# Patient Record
Sex: FEMALE | Race: WHITE | NOT HISPANIC OR LATINO | Employment: FULL TIME | URBAN - METROPOLITAN AREA
[De-identification: names, ages, dates, MRNs, and addresses within clinical notes are randomized per-mention and may not be internally consistent; named-entity substitution may affect disease eponyms.]

---
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
## 2021-09-01 ENCOUNTER — TRANSCRIBE ORDERS (OUTPATIENT)
Dept: LAB | Facility: HOSPITAL | Age: 21
End: 2021-09-01

## 2021-09-01 ENCOUNTER — LAB (OUTPATIENT)
Dept: LAB | Facility: HOSPITAL | Age: 21
End: 2021-09-01

## 2021-09-01 DIAGNOSIS — M79.81 NONTRAUMATIC HEMATOMA OF SOFT TISSUE: ICD-10-CM

## 2021-09-01 DIAGNOSIS — M79.81 NONTRAUMATIC HEMATOMA OF SOFT TISSUE: Primary | ICD-10-CM

## 2021-09-01 LAB
BASOPHILS # BLD MANUAL: 0.06 10*3/MM3 (ref 0–0.2)
BASOPHILS NFR BLD AUTO: 1 % (ref 0–1.5)
DEPRECATED RDW RBC AUTO: 38.5 FL (ref 37–54)
EOSINOPHIL # BLD MANUAL: 0.23 10*3/MM3 (ref 0–0.4)
EOSINOPHIL NFR BLD MANUAL: 4 % (ref 0.3–6.2)
ERYTHROCYTE [DISTWIDTH] IN BLOOD BY AUTOMATED COUNT: 13.2 % (ref 12.3–15.4)
HCT VFR BLD AUTO: 37.7 % (ref 34–46.6)
HGB BLD-MCNC: 11.7 G/DL (ref 12–15.9)
LYMPHOCYTES # BLD MANUAL: 1.31 10*3/MM3 (ref 0.7–3.1)
LYMPHOCYTES NFR BLD MANUAL: 23 % (ref 19.6–45.3)
LYMPHOCYTES NFR BLD MANUAL: 4 % (ref 5–12)
MCH RBC QN AUTO: 25.1 PG (ref 26.6–33)
MCHC RBC AUTO-ENTMCNC: 31 G/DL (ref 31.5–35.7)
MCV RBC AUTO: 80.9 FL (ref 79–97)
MONOCYTES # BLD AUTO: 0.23 10*3/MM3 (ref 0.1–0.9)
NEUTROPHILS # BLD AUTO: 3.86 10*3/MM3 (ref 1.7–7)
NEUTROPHILS NFR BLD MANUAL: 68 % (ref 42.7–76)
PLAT MORPH BLD: NORMAL
PLATELET # BLD AUTO: 215 10*3/MM3 (ref 140–450)
PMV BLD AUTO: 11.9 FL (ref 6–12)
RBC # BLD AUTO: 4.66 10*6/MM3 (ref 3.77–5.28)
RBC MORPH BLD: NORMAL
WBC # BLD AUTO: 5.68 10*3/MM3 (ref 3.4–10.8)
WBC MORPH BLD: NORMAL

## 2021-09-01 PROCEDURE — 85007 BL SMEAR W/DIFF WBC COUNT: CPT

## 2021-09-01 PROCEDURE — 36415 COLL VENOUS BLD VENIPUNCTURE: CPT

## 2021-09-01 PROCEDURE — 85027 COMPLETE CBC AUTOMATED: CPT

## 2021-09-04 ENCOUNTER — TRANSCRIBE ORDERS (OUTPATIENT)
Dept: LAB | Facility: HOSPITAL | Age: 21
End: 2021-09-04

## 2021-09-04 ENCOUNTER — LAB (OUTPATIENT)
Dept: LAB | Facility: HOSPITAL | Age: 21
End: 2021-09-04

## 2021-09-04 DIAGNOSIS — R77.2 ELEVATED ALPHA FETOPROTEIN: ICD-10-CM

## 2021-09-04 DIAGNOSIS — R77.2 ELEVATED ALPHA FETOPROTEIN: Primary | ICD-10-CM

## 2021-09-04 LAB — FERRITIN SERPL-MCNC: 11 NG/ML (ref 13–150)

## 2021-09-04 PROCEDURE — 82728 ASSAY OF FERRITIN: CPT

## 2021-09-04 PROCEDURE — 36415 COLL VENOUS BLD VENIPUNCTURE: CPT

## 2021-09-28 ENCOUNTER — OFFICE VISIT (OUTPATIENT)
Dept: FAMILY MEDICINE CLINIC | Facility: CLINIC | Age: 21
End: 2021-09-28

## 2021-09-28 VITALS
WEIGHT: 139 LBS | RESPIRATION RATE: 16 BRPM | HEART RATE: 111 BPM | SYSTOLIC BLOOD PRESSURE: 130 MMHG | OXYGEN SATURATION: 98 % | HEIGHT: 66 IN | BODY MASS INDEX: 22.34 KG/M2 | DIASTOLIC BLOOD PRESSURE: 82 MMHG

## 2021-09-28 DIAGNOSIS — Z76.89 ENCOUNTER TO ESTABLISH CARE: Primary | ICD-10-CM

## 2021-09-28 DIAGNOSIS — N94.6 MENSTRUAL CRAMPS: ICD-10-CM

## 2021-09-28 DIAGNOSIS — D50.0 IRON DEFICIENCY ANEMIA DUE TO CHRONIC BLOOD LOSS: ICD-10-CM

## 2021-09-28 DIAGNOSIS — J45.20 MILD INTERMITTENT ASTHMA WITHOUT COMPLICATION: ICD-10-CM

## 2021-09-28 DIAGNOSIS — J01.00 ACUTE NON-RECURRENT MAXILLARY SINUSITIS: ICD-10-CM

## 2021-09-28 DIAGNOSIS — J02.9 SORE THROAT: ICD-10-CM

## 2021-09-28 LAB
EXPIRATION DATE: NORMAL
INTERNAL CONTROL: NORMAL
Lab: NORMAL
S PYO AG THROAT QL: NEGATIVE

## 2021-09-28 PROCEDURE — 99203 OFFICE O/P NEW LOW 30 MIN: CPT | Performed by: PHYSICIAN ASSISTANT

## 2021-09-28 PROCEDURE — 87880 STREP A ASSAY W/OPTIC: CPT | Performed by: PHYSICIAN ASSISTANT

## 2021-09-28 RX ORDER — ALBUTEROL SULFATE 90 UG/1
2 AEROSOL, METERED RESPIRATORY (INHALATION) EVERY 4 HOURS PRN
COMMUNITY

## 2021-09-28 RX ORDER — FERROUS SULFATE 325(65) MG
TABLET ORAL
COMMUNITY
Start: 2021-09-07 | End: 2021-10-12

## 2021-09-28 RX ORDER — AZITHROMYCIN 250 MG/1
TABLET, FILM COATED ORAL
Qty: 6 TABLET | Refills: 0 | Status: SHIPPED | OUTPATIENT
Start: 2021-09-28 | End: 2021-10-11

## 2021-09-28 NOTE — PROGRESS NOTES
Chief Complaint   Patient presents with   • Establish Care   • Nasal Congestion     Started Saturday    • Sore Throat       HPI     Marjorie Goff is a 21 y.o. female who presents to establish care.  Patient has been going to [facility] prior to establishing care.  She is established with gynecology and was seen yesterday.  She has had a Pap smear.  Denies any concerns for STDs.  She is prescribed birth control to help with her menorrhagia and menstrual cramps.  She reports an iron deficiency anemia secondary to her menstrual bleeding.  She is currently taking an iron supplement.  She has been referred to psychiatry as well for depression, anxiety, PTSD and ADD.    Her main concern today is her ongoing nasal drainage, sore throat and facial pressure that started 4 days ago.  She denies shortness of breath, cough, fever/chills, ear pain.  She has not been vaccinated for COVID-19.  He is allergic to penicillin.        Chief Complaint   Patient presents with   • Establish Care   • Nasal Congestion     Started Saturday    • Sore Throat       Past Medical History:   Diagnosis Date   • ADHD (attention deficit hyperactivity disorder)    • Anxiety    • Depression    • PTSD (post-traumatic stress disorder)        History reviewed. No pertinent surgical history.    Family History   Problem Relation Age of Onset   • Depression Mother    • Diabetes Maternal Grandmother    • Stroke Maternal Grandmother    • No Known Problems Father    • Breast cancer Neg Hx    • Ovarian cancer Neg Hx        Social History     Socioeconomic History   • Marital status: Single     Spouse name: Not on file   • Number of children: Not on file   • Years of education: Not on file   • Highest education level: Not on file   Tobacco Use   • Smoking status: Never Smoker   • Smokeless tobacco: Never Used   Substance and Sexual Activity   • Alcohol use: Never   • Drug use: Never   • Sexual activity: Yes     Partners: Female     Birth control/protection:  "Pill       Allergies   Allergen Reactions   • Amoxicillin Hives   • Penicillins Hives       ROS    Review of Systems   Constitutional: Positive for fatigue. Negative for activity change, appetite change, chills, diaphoresis, fever, unexpected weight gain and unexpected weight loss.   HENT: Positive for ear pain, rhinorrhea, sinus pressure and sore throat. Negative for congestion, dental problem, hearing loss, nosebleeds and trouble swallowing.    Eyes: Negative for blurred vision, pain, redness and visual disturbance.   Respiratory: Positive for chest tightness and shortness of breath. Negative for apnea, cough and wheezing.    Cardiovascular: Negative for chest pain, palpitations and leg swelling.   Gastrointestinal: Negative for abdominal distention, abdominal pain, anal bleeding, blood in stool, constipation, diarrhea, nausea, vomiting, GERD and indigestion.   Endocrine: Positive for cold intolerance. Negative for heat intolerance, polydipsia, polyphagia and polyuria.   Genitourinary: Positive for menstrual problem. Negative for decreased urine volume, difficulty urinating, dysuria, frequency, hematuria, urgency and urinary incontinence.   Musculoskeletal: Positive for arthralgias. Negative for gait problem, joint swelling and bursitis.   Skin: Negative for dry skin, rash, skin lesions and bruise.   Neurological: Positive for headache. Negative for dizziness, tremors, seizures, syncope, speech difficulty, weakness, light-headedness, memory problem and confusion.   Hematological: Bruises/bleeds easily.   Psychiatric/Behavioral: Positive for agitation, decreased concentration, sleep disturbance, depressed mood and stress. Negative for behavioral problems, hallucinations and suicidal ideas. The patient is nervous/anxious.        Vitals:    09/28/21 1240   BP: 130/82   Pulse: 111   Resp: 16   SpO2: 98%   Weight: 63 kg (139 lb)   Height: 167.6 cm (66\")   PainSc:   4     Body mass index is 22.44 kg/m².    Current " Outpatient Medications on File Prior to Visit   Medication Sig Dispense Refill   • albuterol sulfate  (90 Base) MCG/ACT inhaler Inhale 2 puffs Every 4 (Four) Hours As Needed.     • FeroSul 325 (65 Fe) MG tablet      • Levonorgest-Eth Estrad-Fe Bisg (BALCOLTRA) 0.1-20 MG-MCG(21) per tablet Take 1 tablet by mouth Daily.       No current facility-administered medications on file prior to visit.       Results for orders placed or performed in visit on 09/28/21   POCT rapid strep A    Specimen: Swab   Result Value Ref Range    Rapid Strep A Screen Negative Negative, VALID, INVALID, Not Performed    Internal Control Passed Passed    Lot Number [id number]     Expiration Date 12,022,022        PE  Physical Exam  Vitals reviewed.   Constitutional:       General: She is not in acute distress.     Appearance: Normal appearance. She is well-developed and normal weight. She is ill-appearing. She is not diaphoretic.   HENT:      Head: Normocephalic and atraumatic.      Right Ear: Hearing, tympanic membrane, ear canal and external ear normal.      Left Ear: Hearing, ear canal and external ear normal. Tympanic membrane is erythematous.      Nose: Rhinorrhea present.      Right Sinus: Maxillary sinus tenderness present. No frontal sinus tenderness.      Left Sinus: Maxillary sinus tenderness present. No frontal sinus tenderness.      Mouth/Throat:      Pharynx: Uvula midline. No oropharyngeal exudate or posterior oropharyngeal erythema.   Eyes:      Extraocular Movements: Extraocular movements intact.      Conjunctiva/sclera: Conjunctivae normal.   Cardiovascular:      Rate and Rhythm: Normal rate and regular rhythm.      Heart sounds: Normal heart sounds. No murmur heard.   No friction rub. No gallop.    Pulmonary:      Effort: Pulmonary effort is normal. No respiratory distress.      Breath sounds: Normal breath sounds.   Musculoskeletal:         General: Normal range of motion.      Cervical back: Normal range of motion.       Right lower leg: No edema.      Left lower leg: No edema.   Skin:     General: Skin is warm.      Findings: No erythema or rash.   Neurological:      General: No focal deficit present.      Mental Status: She is alert and oriented to person, place, and time.   Psychiatric:         Attention and Perception: She is attentive.         Mood and Affect: Mood normal.         Speech: Speech normal.         Behavior: Behavior normal. Behavior is cooperative.         Thought Content: Thought content normal.         Judgment: Judgment normal.         A/P    Diagnoses and all orders for this visit:    1. Encounter to establish care (Primary)    2. Sore throat  -     POCT rapid strep A  Strep negative.    3. Acute non-recurrent maxillary sinusitis  -     azithromycin (Zithromax Z-Rory) 250 MG tablet; Take 2 tablets the first day, then 1 tablet daily for 4 days.  Dispense: 6 tablet; Refill: 0  TTP along bilateral maxillary sinuses with left ear infection.  Cover with azithromycin.    4. Menstrual cramps  Established with gynecology.  Started on birth control.    5. Iron deficiency anemia due to chronic blood loss  On iron tablet daily.  Repeat CBC at follow-up in 3 months.    6. Mild intermittent asthma without complication  Stable currently on inhaler.  Patient is not sure which inhaler she is using, will call with information.       Plan of care reviewed with patient at the conclusion of today's visit. Education was provided regarding diagnosis, management and any prescribed or recommended OTC medications.  Patient verbalizes understanding of and agreement with management plan.    Return in about 4 months (around 1/17/2022) for Annual physical.     Junie Covarrubias PA-C

## 2021-10-11 ENCOUNTER — OFFICE VISIT (OUTPATIENT)
Dept: FAMILY MEDICINE CLINIC | Facility: CLINIC | Age: 21
End: 2021-10-11

## 2021-10-11 ENCOUNTER — LAB (OUTPATIENT)
Dept: LAB | Facility: HOSPITAL | Age: 21
End: 2021-10-11

## 2021-10-11 VITALS
OXYGEN SATURATION: 98 % | BODY MASS INDEX: 23.73 KG/M2 | DIASTOLIC BLOOD PRESSURE: 82 MMHG | HEIGHT: 64 IN | HEART RATE: 94 BPM | WEIGHT: 139 LBS | SYSTOLIC BLOOD PRESSURE: 122 MMHG | TEMPERATURE: 97.7 F

## 2021-10-11 DIAGNOSIS — D50.0 IRON DEFICIENCY ANEMIA DUE TO CHRONIC BLOOD LOSS: ICD-10-CM

## 2021-10-11 DIAGNOSIS — M25.542 ARTHRALGIA OF BOTH HANDS: ICD-10-CM

## 2021-10-11 DIAGNOSIS — G56.23 ULNAR NEUROPATHY OF BOTH UPPER EXTREMITIES: ICD-10-CM

## 2021-10-11 DIAGNOSIS — G56.23 ULNAR NEUROPATHY OF BOTH UPPER EXTREMITIES: Primary | ICD-10-CM

## 2021-10-11 DIAGNOSIS — M25.541 ARTHRALGIA OF BOTH HANDS: ICD-10-CM

## 2021-10-11 LAB
ALBUMIN SERPL-MCNC: 4.8 G/DL (ref 3.5–5.2)
ALBUMIN/GLOB SERPL: 1.9 G/DL
ALP SERPL-CCNC: 54 U/L (ref 39–117)
ALT SERPL W P-5'-P-CCNC: 11 U/L (ref 1–33)
ANION GAP SERPL CALCULATED.3IONS-SCNC: 11.7 MMOL/L (ref 5–15)
AST SERPL-CCNC: 21 U/L (ref 1–32)
BILIRUB SERPL-MCNC: 1 MG/DL (ref 0–1.2)
BUN SERPL-MCNC: 8 MG/DL (ref 6–20)
BUN/CREAT SERPL: 11.1 (ref 7–25)
CALCIUM SPEC-SCNC: 8.8 MG/DL (ref 8.6–10.5)
CHLORIDE SERPL-SCNC: 106 MMOL/L (ref 98–107)
CO2 SERPL-SCNC: 23.3 MMOL/L (ref 22–29)
CREAT SERPL-MCNC: 0.72 MG/DL (ref 0.57–1)
ERYTHROCYTE [SEDIMENTATION RATE] IN BLOOD: 14 MM/HR (ref 0–20)
GFR SERPL CREATININE-BSD FRML MDRD: 103 ML/MIN/1.73
GFR SERPL CREATININE-BSD FRML MDRD: 125 ML/MIN/1.73
GLOBULIN UR ELPH-MCNC: 2.5 GM/DL
GLUCOSE SERPL-MCNC: 66 MG/DL (ref 65–99)
HCT VFR BLD AUTO: 37.7 % (ref 34–46.6)
HGB BLD-MCNC: 11.8 G/DL (ref 12–15.9)
IRON 24H UR-MRATE: 171 MCG/DL (ref 37–145)
IRON SATN MFR SERPL: 36 % (ref 20–50)
POTASSIUM SERPL-SCNC: 5 MMOL/L (ref 3.5–5.2)
PROT SERPL-MCNC: 7.3 G/DL (ref 6–8.5)
SODIUM SERPL-SCNC: 141 MMOL/L (ref 136–145)
TIBC SERPL-MCNC: 480 MCG/DL (ref 298–536)
TRANSFERRIN SERPL-MCNC: 322 MG/DL (ref 200–360)
TSH SERPL DL<=0.05 MIU/L-ACNC: 0.81 UIU/ML (ref 0.27–4.2)

## 2021-10-11 PROCEDURE — 86140 C-REACTIVE PROTEIN: CPT

## 2021-10-11 PROCEDURE — 85652 RBC SED RATE AUTOMATED: CPT

## 2021-10-11 PROCEDURE — 99213 OFFICE O/P EST LOW 20 MIN: CPT | Performed by: PHYSICIAN ASSISTANT

## 2021-10-11 PROCEDURE — 84443 ASSAY THYROID STIM HORMONE: CPT

## 2021-10-11 PROCEDURE — 85014 HEMATOCRIT: CPT

## 2021-10-11 PROCEDURE — 83540 ASSAY OF IRON: CPT

## 2021-10-11 PROCEDURE — 36415 COLL VENOUS BLD VENIPUNCTURE: CPT

## 2021-10-11 PROCEDURE — 85018 HEMOGLOBIN: CPT

## 2021-10-11 PROCEDURE — 84466 ASSAY OF TRANSFERRIN: CPT

## 2021-10-11 PROCEDURE — 80053 COMPREHEN METABOLIC PANEL: CPT

## 2021-10-12 ENCOUNTER — TELEPHONE (OUTPATIENT)
Dept: FAMILY MEDICINE CLINIC | Facility: CLINIC | Age: 21
End: 2021-10-12

## 2021-10-12 LAB — CRP SERPL-MCNC: <0.3 MG/DL (ref 0–0.5)

## 2021-10-12 NOTE — PROGRESS NOTES
"Chief Complaint   Patient presents with   • Hand Pain       HPI      Marjorie Goff is a 21 y.o. female who presents for Hand Pain.  Patient reports new onset and worsening hand pain, right worse than left.  Patient reports pain that extends from her elbow to her pinky.  Pain comes and goes depending upon what she is doing.  She has paresthesia in her finger as well.  No weakness.      Patient is taking iron supplement daily.  Patient reports menorrhagia.  She is established with gynecology and on birth control currently.    Past Medical History:   Diagnosis Date   • ADHD (attention deficit hyperactivity disorder)    • Anxiety    • Depression    • PTSD (post-traumatic stress disorder)        History reviewed. No pertinent surgical history.    Family History   Problem Relation Age of Onset   • Depression Mother    • Diabetes Maternal Grandmother    • Stroke Maternal Grandmother    • No Known Problems Father    • Breast cancer Neg Hx    • Ovarian cancer Neg Hx        Social History     Socioeconomic History   • Marital status: Single   Tobacco Use   • Smoking status: Never Smoker   • Smokeless tobacco: Never Used   Vaping Use   • Vaping Use: Never used   Substance and Sexual Activity   • Alcohol use: Never   • Drug use: Never   • Sexual activity: Yes     Partners: Female     Birth control/protection: Pill       Allergies   Allergen Reactions   • Amoxicillin Hives   • Penicillins Hives       ROS    Review of Systems   Constitutional: Positive for fatigue. Negative for chills and fever.   Respiratory: Negative for shortness of breath.    Musculoskeletal: Positive for arthralgias and myalgias. Negative for joint swelling.   Neurological: Positive for numbness. Negative for weakness.       Vitals:    10/11/21 1131   BP: 122/82   Pulse: 94   Temp: 97.7 °F (36.5 °C)   SpO2: 98%   Weight: 63 kg (139 lb)   Height: 162.6 cm (64\")     Body mass index is 23.86 kg/m².    Current Outpatient Medications on File Prior to Visit "   Medication Sig Dispense Refill   • albuterol sulfate  (90 Base) MCG/ACT inhaler Inhale 2 puffs Every 4 (Four) Hours As Needed.     • FeroSul 325 (65 Fe) MG tablet      • Levonorgest-Eth Estrad-Fe Bisg (BALCOLTRA) 0.1-20 MG-MCG(21) per tablet Take 1 tablet by mouth Daily.     • [DISCONTINUED] azithromycin (Zithromax Z-Rory) 250 MG tablet Take 2 tablets the first day, then 1 tablet daily for 4 days. 6 tablet 0     No current facility-administered medications on file prior to visit.       Results for orders placed or performed in visit on 09/28/21   POCT rapid strep A    Specimen: Swab   Result Value Ref Range    Rapid Strep A Screen Negative Negative, VALID, INVALID, Not Performed    Internal Control Passed Passed    Lot Number [id number]     Expiration Date 12,022,022        PE    Physical Exam  Vitals reviewed.   Constitutional:       General: She is not in acute distress.     Appearance: Normal appearance. She is well-developed and normal weight. She is not ill-appearing or diaphoretic.   HENT:      Head: Normocephalic and atraumatic.   Eyes:      Extraocular Movements: Extraocular movements intact.      Conjunctiva/sclera: Conjunctivae normal.   Pulmonary:      Effort: No respiratory distress.   Musculoskeletal:         General: Normal range of motion.        Arms:       Cervical back: Normal range of motion.   Neurological:      General: No focal deficit present.      Mental Status: She is alert.   Psychiatric:         Attention and Perception: She is attentive.         Mood and Affect: Mood normal.         Speech: Speech normal.         Behavior: Behavior normal. Behavior is cooperative.         Thought Content: Thought content normal.         Judgment: Judgment normal.          A/P    Diagnoses and all orders for this visit:    1. Ulnar neuropathy of both upper extremities (Primary)  -     EMG & Nerve Conduction Test; Future  -     Comprehensive Metabolic Panel; Future  Pain is reproduced with ulnar  manipulation.  Check EMG/NCS.    2. Arthralgia of both hands  -     C-reactive Protein; Future  -     Sedimentation Rate; Future  -     TSH Rfx On Abnormal To Free T4; Future  -     Comprehensive Metabolic Panel; Future  Check labs.  Suspect ulnar neuropathy.  R/O inflammation.  If labs are elevated, will check RF and ERIN.    3. Iron deficiency anemia due to chronic blood loss  -     Iron Profile; Future  -     Hemoglobin and hematocrit, blood; Future  -     Comprehensive Metabolic Panel; Future  Currently on iron supplementation.       Plan of care reviewed with patient at the conclusion of today's visit. Education was provided regarding diagnosis, management and any prescribed or recommended OTC medications.  Patient verbalizes understanding of and agreement with management plan.    No follow-ups on file.     Junie Covarrubias PA-C

## 2021-11-23 ENCOUNTER — HOSPITAL ENCOUNTER (OUTPATIENT)
Dept: NEUROLOGY | Facility: HOSPITAL | Age: 21
Discharge: HOME | End: 2021-11-23
Admitting: PHYSICIAN ASSISTANT

## 2021-11-23 DIAGNOSIS — G56.23 ULNAR NEUROPATHY OF BOTH UPPER EXTREMITIES: ICD-10-CM

## 2021-11-23 DIAGNOSIS — G56.23 ULNAR NEUROPATHY OF BOTH UPPER EXTREMITIES: Primary | ICD-10-CM

## 2021-11-23 PROCEDURE — 95886 MUSC TEST DONE W/N TEST COMP: CPT

## 2021-11-23 PROCEDURE — 95908 NRV CNDJ TST 3-4 STUDIES: CPT

## 2021-11-24 ENCOUNTER — TELEPHONE (OUTPATIENT)
Dept: FAMILY MEDICINE CLINIC | Facility: CLINIC | Age: 21
End: 2021-11-24

## 2021-11-24 NOTE — TELEPHONE ENCOUNTER
Caller: Marjorie Goff    Relationship: Self    Best call back number: [phone]    Who are you requesting to speak with (clinical staff, provider,  specific staff member): MOE CHAVIRA    What was the call regarding: PATIENT STATED SURGERY WAS RECOMMENDED AFTER HER EMG TEST AND SHE WOULD LIKE TO KNOW IF THERE ARE ANY OTHER OPTIONS TO HELP HER.    Do you require a callback: YES

## 2021-11-27 NOTE — TELEPHONE ENCOUNTER
I am not sure if surgery is the recommendation.  She will need to see orthopedic surgery to determine what options they feel will benefit her.  It looks like she has appointment on 12/02 with Dr. Muñoz and she can discuss further with him.

## 2021-12-02 ENCOUNTER — OFFICE VISIT (OUTPATIENT)
Dept: ORTHOPEDIC SURGERY | Facility: CLINIC | Age: 21
End: 2021-12-02

## 2021-12-02 VITALS
HEIGHT: 64 IN | SYSTOLIC BLOOD PRESSURE: 139 MMHG | DIASTOLIC BLOOD PRESSURE: 93 MMHG | BODY MASS INDEX: 23.71 KG/M2 | HEART RATE: 121 BPM | WEIGHT: 138.89 LBS

## 2021-12-02 DIAGNOSIS — G56.21 ULNAR NEUROPATHY AT ELBOW, RIGHT: Primary | ICD-10-CM

## 2021-12-02 DIAGNOSIS — M25.521 RIGHT ELBOW PAIN: ICD-10-CM

## 2021-12-02 PROCEDURE — 99204 OFFICE O/P NEW MOD 45 MIN: CPT | Performed by: ORTHOPAEDIC SURGERY

## 2021-12-02 RX ORDER — METHYLPREDNISOLONE 4 MG/1
TABLET ORAL
Qty: 1 EACH | Refills: 0 | OUTPATIENT
Start: 2021-12-02 | End: 2021-12-29

## 2021-12-02 RX ORDER — MELOXICAM 7.5 MG/1
TABLET ORAL
Qty: 30 TABLET | Refills: 1 | OUTPATIENT
Start: 2021-12-02 | End: 2021-12-29

## 2021-12-02 NOTE — PROGRESS NOTES
[facility] Orthopaedic Surgery Office Visit - Brian Muñoz MD    Office Visit       Patient Name: Marjorie Goff    Chief Complaint:   Chief Complaint   Patient presents with   • Right Elbow - Pain       Referring Physician: Junie Covarrubias,*-I appreciate the referral    History of Present Illness:   Marjorie Goff is a 21 y.o. female who presents with right body part: elbow Reason: pain.  Onset:Onset: atraumatic and gradual in nature. The issue has been ongoing for 3 month(s). Pain is a 4/10 on the pain scale. Pain is described as Pain Characterization: aching and shooting. Associated symptoms include Symptoms: pain and giving way/buckling. The pain is worse with working and any movement of the joint; resting and pain medication and/or NSAID improve the pain. Previous treatments have included: NSAIDS. I have reviewed the patient's history of present illness as noted/entered above.    I have reviewed the patient's past medical history, surgical history, social history, family history, medications, and allergies as noted in the electronic medical record and as noted/entered.  I have reviewed the patient's review of systems as noted/enter and updated as noted in the patient's HPI.      RIGHT elbow ulnar neuropathy  Sleeps in fetal position -- counseled on prevention of ulnar neuropathy, possible bracing if desires he can be obtained over-the-counter      Subjective   Subjective      Review of Systems   Constitutional: Negative.  Negative for chills, fatigue and fever.   HENT: Negative.  Negative for congestion and dental problem.    Eyes: Negative.  Negative for blurred vision.   Respiratory: Negative.  Negative for shortness of breath.    Cardiovascular: Negative.  Negative for leg swelling.   Gastrointestinal: Negative.  Negative for abdominal pain.   Endocrine: Negative.  Negative for polyuria.   Genitourinary: Negative.  Negative for  difficulty urinating.   Musculoskeletal: Positive for arthralgias.   Skin: Negative.    Allergic/Immunologic: Negative.    Neurological: Negative.    Hematological: Negative.  Negative for adenopathy.   Psychiatric/Behavioral: Negative.  Negative for behavioral problems.        Past Medical History:   Past Medical History:   Diagnosis Date   • ADHD (attention deficit hyperactivity disorder)    • Anxiety    • Depression    • PTSD (post-traumatic stress disorder)        Past Surgical History: No past surgical history on file.    Family History:   Family History   Problem Relation Age of Onset   • Depression Mother    • Diabetes Maternal Grandmother    • Stroke Maternal Grandmother    • No Known Problems Father    • Breast cancer Neg Hx    • Ovarian cancer Neg Hx        Social History:   Social History     Socioeconomic History   • Marital status: Single   Tobacco Use   • Smoking status: Never Smoker   • Smokeless tobacco: Never Used   Vaping Use   • Vaping Use: Never used   Substance and Sexual Activity   • Alcohol use: Never   • Drug use: Never   • Sexual activity: Yes     Partners: Female     Birth control/protection: Pill       Medications:   Current Outpatient Medications:   •  albuterol sulfate  (90 Base) MCG/ACT inhaler, Inhale 2 puffs Every 4 (Four) Hours As Needed., Disp: , Rfl:   •  Levonorgest-Eth Estrad-Fe Bisg (BALCOLTRA) 0.1-20 MG-MCG(21) per tablet, Take 1 tablet by mouth Daily., Disp: , Rfl:   •  meloxicam (MOBIC) 7.5 MG tablet, 1 Oral Daily with food., Disp: 30 tablet, Rfl: 1  •  methylPREDNISolone (MEDROL) 4 MG dose pack, Use as directed by package instructions, Disp: 1 each, Rfl: 0    Allergies:   Allergies   Allergen Reactions   • Amoxicillin Hives   • Penicillins Hives       The following portions of the patient's history were reviewed and updated as appropriate: allergies, current medications, past family history, past medical history, past social history, past surgical history and problem  "list.        Objective    Objective      Vital Signs:   Vitals:    12/02/21 1431   BP: 139/93   Pulse: (!) 121   Weight: 63 kg (138 lb 14.2 oz)   Height: 162.6 cm (64.02\")       Ortho Exam:  Right elbow demonstrates no skin changes, mild Tinel's over the ulnar nerve excellent 5 out of 5 strength and sensation light touch is intact distally including the ulnar nerve distribution today.  Her ulnar nerve symptoms are transient and doing better today.  Slight elevated pulse noted with vitals    Results Review:   Imaging Results (Last 24 Hours)     Procedure Component Value Units Date/Time    XR Elbow 2 View Right [[id number]] Resulted: 12/02/21 1454     Updated: 12/02/21 1454    Narrative:      Imaging: elbow x-rays 2 views - AP and lateral elbow x-ray views    Side: RIGHT ELBOW    Indication for elbow x-ray 2 views: elbow pain    Comparison: no comparison views available    Findings: no acute bony finding noted, no obvious soft tissue edema    I personally reviewed the above x-rays and discussed with the patient.          EMG & Nerve Conduction Test    Result Date: 11/23/2021  Ulnar neuropathy at the elbow on the right, mild-moderate This report is transcribed using the Dragon dictation system.       I reviewed the findings above does have some early findings of ulnar neuropathy the right elbow discussed with patient    Procedures             Assessment / Plan      Assessment/Plan:   Problem List Items Addressed This Visit        Musculoskeletal and Injuries    Right elbow pain    Relevant Medications    methylPREDNISolone (MEDROL) 4 MG dose pack    meloxicam (MOBIC) 7.5 MG tablet    Other Relevant Orders    XR Elbow 2 View Right (Completed)       Neuro    Ulnar neuropathy at elbow, right - Primary    Relevant Medications    methylPREDNISolone (MEDROL) 4 MG dose pack    meloxicam (MOBIC) 7.5 MG tablet        Counseled on right ulnar neuropathy could have a neuritis type picture given the transient nature I think she " will respond to Medrol Dosepak and anti-inflammatories.  I think activity modifications of sleep to prevent the fetal type position with sleeping will help as well.  Counseled demonstrated and discussed over-the-counter brace for this as well if desired.  Should keep me updated over the next 6 weeks her symptoms at this time are transient and I would recommend against surgery.  Counseled on conservative versus operative treatment options.      Medrol dosepak - risks and benefit of this medication was discussed including risks with Diabetes and bump in blood glucose.  A 6-day steroid Medrol dosepak was recommended followed by an NSAID after the dosepak is finished.        NSAIDS - risks and benefits of these medications were discussed.  These medications are certainly not without risks, but they can provide relief of pain caused due to inflammation.    Follow Up: 6 weeks to reassess        Brian Muñoz MD, FAAOS  Orthopedic Surgeon  Fellowship Trained Shoulder and Elbow Surgeon  [facility]  Orthopedics and Sports Medicine  [address] [place] [address], [address]  [place] [address]    12/02/21  15:02 EST

## 2021-12-03 ENCOUNTER — OFFICE VISIT (OUTPATIENT)
Dept: FAMILY MEDICINE CLINIC | Facility: CLINIC | Age: 21
End: 2021-12-03

## 2021-12-03 VITALS
WEIGHT: 140.4 LBS | TEMPERATURE: 97.1 F | HEART RATE: 138 BPM | DIASTOLIC BLOOD PRESSURE: 86 MMHG | OXYGEN SATURATION: 97 % | HEIGHT: 64 IN | BODY MASS INDEX: 23.97 KG/M2 | SYSTOLIC BLOOD PRESSURE: 128 MMHG

## 2021-12-03 DIAGNOSIS — J06.9 VIRAL UPPER RESPIRATORY TRACT INFECTION: Primary | ICD-10-CM

## 2021-12-03 DIAGNOSIS — R00.0 TACHYCARDIA: ICD-10-CM

## 2021-12-03 PROCEDURE — 87804 INFLUENZA ASSAY W/OPTIC: CPT | Performed by: PHYSICIAN ASSISTANT

## 2021-12-03 PROCEDURE — 93000 ELECTROCARDIOGRAM COMPLETE: CPT | Performed by: PHYSICIAN ASSISTANT

## 2021-12-03 PROCEDURE — U0004 COV-19 TEST NON-CDC HGH THRU: HCPCS | Performed by: PHYSICIAN ASSISTANT

## 2021-12-03 PROCEDURE — 99213 OFFICE O/P EST LOW 20 MIN: CPT | Performed by: PHYSICIAN ASSISTANT

## 2021-12-03 NOTE — PROGRESS NOTES
"    Chief Complaint   Patient presents with   • Fever     Pt states symptoms have only been active for a day and half   • Generalized Body Aches   • Chills   • Nasal Congestion     Runny nose   • Anxiety     Completed EVON questions and pt would like to discuss with dr JONNY Amador [place] is a pleasant 21 y.o. female with a PMH of asthma who presents for evaluation of \"chief complaint.\"    Patient c/o runny nose, chills, body aches, sinus pressure, and fever since yesterday morning. Her temperature at home was 100.1 earlier today. 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 She has not been vaccinated for COVID or influenza. Denies shortness of breath, wheezing, diarrhea, vomiting, loss of taste/smell, or headache.       Past Medical History:   Diagnosis Date   • ADHD (attention deficit hyperactivity disorder)    • Anxiety    • Depression    • PTSD (post-traumatic stress disorder)        History reviewed. No pertinent surgical history.    Family History   Problem Relation Age of Onset   • Depression Mother    • Diabetes Maternal Grandmother    • Stroke Maternal Grandmother    • No Known Problems Father    • Breast cancer Neg Hx    • Ovarian cancer Neg Hx        Social History     Socioeconomic History   • Marital status: Single   Tobacco Use   • Smoking status: Never Smoker   • Smokeless tobacco: Never Used   Vaping Use   • Vaping Use: Never used   Substance and Sexual Activity   • Alcohol use: Never   • Drug use: Never   • Sexual activity: Yes     Partners: Female     Birth control/protection: Pill       Allergies   Allergen Reactions   • Amoxicillin Hives   • Penicillins Hives       ROS    Review of Systems   Constitutional: Positive for chills and fever.   HENT: Positive for congestion, rhinorrhea and sinus pressure. Negative for ear pain, postnasal drip and sore throat.    Respiratory: Negative for cough, " shortness of breath and wheezing.    Cardiovascular: Negative for chest pain.   Gastrointestinal: Negative for diarrhea, nausea and vomiting.   Genitourinary: Negative for dysuria and frequency.   Musculoskeletal: Positive for myalgias.   Neurological: Negative for headache.       Vitals:    12/03/21 1540   BP: 128/86   Pulse: (!) 138   Temp: 97.1 °F (36.2 °C)   SpO2: 97%     Body mass index is 24.09 kg/m².      Current Outpatient Medications:   •  albuterol sulfate  (90 Base) MCG/ACT inhaler, Inhale 2 puffs Every 4 (Four) Hours As Needed., Disp: , Rfl:   •  Levonorgest-Eth Estrad-Fe Bisg (BALCOLTRA) 0.1-20 MG-MCG(21) per tablet, Take 1 tablet by mouth Daily., Disp: , Rfl:   •  meloxicam (MOBIC) 7.5 MG tablet, 1 Oral Daily with food., Disp: 30 tablet, Rfl: 1  •  methylPREDNISolone (MEDROL) 4 MG dose pack, Use as directed by package instructions, Disp: 1 each, Rfl: 0    PE    Physical Exam  Vitals reviewed.   Constitutional:       General: She is not in acute distress.     Appearance: She is well-developed.   HENT:      Head: Normocephalic.      Right Ear: Tympanic membrane and ear canal normal. Tympanic membrane is not erythematous.      Left Ear: Tympanic membrane and ear canal normal. Tympanic membrane is not erythematous.      Nose:      Right Sinus: No maxillary sinus tenderness or frontal sinus tenderness.      Left Sinus: No maxillary sinus tenderness or frontal sinus tenderness.      Mouth/Throat:      Mouth: Mucous membranes are moist.      Pharynx: Oropharynx is clear. Uvula midline. No posterior oropharyngeal erythema.      Tonsils: No tonsillar exudate.   Eyes:      General:         Right eye: No discharge.         Left eye: No discharge.      Conjunctiva/sclera: Conjunctivae normal.   Cardiovascular:      Rate and Rhythm: Regular rhythm. Tachycardia present.      Heart sounds: No murmur heard.      Pulmonary:      Effort: Pulmonary effort is normal. No respiratory distress.      Breath sounds:  Normal breath sounds. No wheezing, rhonchi or rales.   Chest:   Breasts:      Right: No supraclavicular adenopathy.      Left: No supraclavicular adenopathy.       Musculoskeletal:      Cervical back: Normal range of motion and neck supple.   Lymphadenopathy:      Cervical: No cervical adenopathy.      Upper Body:      Right upper body: No supraclavicular adenopathy.      Left upper body: No supraclavicular adenopathy.   Skin:     General: Skin is warm and dry.   Psychiatric:         Behavior: Behavior normal.         ECG 12 Lead    Date/Time: 12/3/2021 4:20 PM  Performed by: Jay Santana PA  Authorized by: Jay Santana PA   Comparison: not compared with previous ECG   Rhythm: sinus tachycardia  Rate: tachycardic  BPM: 118  Conduction: conduction normal  Other findings: T wave abnormality    Clinical impression: non-specific ECG  Comments: Nonspecific T wave abnormality (inverted T wave in lead III)          A/P    Problem List Items Addressed This Visit     None      Visit Diagnoses     Viral upper respiratory tract infection    -  Primary  -Recent exposures to influenza. Influenza swab is negative today  -Will also order COVID and mono testing   -Discussed symptomatic/supportive management  -Patient was counseled to quarantine at home until her COVID test returns. She should be reevaluated at the ER or [facility] if she has shortness of breath    Relevant Orders    POC Influenza A / B    COVID-19 PCR, [facility], NP SWAB IN [facility] TRANSPORT MEDIA/ORAL SWISH 24-30 HR TAT - Swab, Nasopharynx    Tachycardia      -sinus tachycardia    Relevant Orders    ECG 12 Lead          Plan of care was reviewed with patient at the conclusion of today's visit. Education was provided regarding diagnoses, management, prescribed or recommended OTC products, and the importance of compliance with follow-up appointments. The patient was counseled regarding the risks, benefits, and possible side-effects of treatment. I advised the  patient to keep me informed of any acute changes in their status including new, worsening, or persistent symptoms. Patient expresses understanding and agreement with the management plan.        PELON Carson

## 2021-12-04 LAB
EXPIRATION DATE: NORMAL
FLUAV AG NPH QL: NEGATIVE
FLUBV AG NPH QL: NEGATIVE
INTERNAL CONTROL: NORMAL
Lab: 2780
SARS-COV-2 RNA NOSE QL NAA+PROBE: NOT DETECTED

## 2021-12-05 DIAGNOSIS — J06.9 VIRAL UPPER RESPIRATORY TRACT INFECTION: Primary | ICD-10-CM

## 2021-12-06 ENCOUNTER — TELEPHONE (OUTPATIENT)
Dept: FAMILY MEDICINE CLINIC | Facility: CLINIC | Age: 21
End: 2021-12-06

## 2021-12-06 ENCOUNTER — LAB (OUTPATIENT)
Dept: LAB | Facility: HOSPITAL | Age: 21
End: 2021-12-06

## 2021-12-06 DIAGNOSIS — J06.9 VIRAL UPPER RESPIRATORY TRACT INFECTION: ICD-10-CM

## 2021-12-06 LAB — HETEROPH AB SER QL LA: NEGATIVE

## 2021-12-06 PROCEDURE — 86308 HETEROPHILE ANTIBODY SCREEN: CPT

## 2021-12-06 NOTE — TELEPHONE ENCOUNTER
Pt returned call to the office the message was relayed by the hub but pt had additional questions and was transferred to the office.     Pt states she is having a stuffy nose & body sweats. I advised her that PCP has ordered a mono test if she would like to have it done. Lab hours given to patient. Pt verbalized understanding and did not have any additional questions at this time.

## 2021-12-06 NOTE — TELEPHONE ENCOUNTER
Called and spoke with pt. Informed pt of results and information below. Pt verbalized understanding and stated will come into the office to get blood work drawn. Pt has no further questions at this time.     ----- Message from PELON Carson sent at 12/5/2021 10:40 AM EST -----  Please notify the patient her Covid test is negative. If she is still having symptoms, I would like her to come to the lab on Monday for blood work to evaluate for mononucleosis.

## 2021-12-06 NOTE — TELEPHONE ENCOUNTER
"Attempted to contact patient to inform of result notes as ordered by provider and as listed below. No answer; Left voicemail for patient to return call with office number given.HUB please relay the following to patient when she returns call, \"Please notify the patient her Covid test is negative. If she is still having symptoms, I would like her to come to the lab on Monday for blood work to evaluate for mononucleosis.\"    "

## 2021-12-08 ENCOUNTER — TELEPHONE (OUTPATIENT)
Dept: FAMILY MEDICINE CLINIC | Facility: CLINIC | Age: 21
End: 2021-12-08

## 2021-12-08 NOTE — TELEPHONE ENCOUNTER
Patient returned call from provider's office, unable to connect with nurses, please call patient back at [phone].    Thank you.

## 2021-12-08 NOTE — TELEPHONE ENCOUNTER
Contacted patient, relayed results. She states she feels well, still experiencing runny nose and night sweats. I advised her to keep us updated if this does not improve quickly

## 2021-12-08 NOTE — TELEPHONE ENCOUNTER
Attempted to call pt, received no answer. Left  with office number given.     ----- Message from PELON Carson sent at 12/7/2021  5:55 PM EST -----  Please notify the patient that the test for mononucleosis is negative.  Ask for an update on her status.  Thank you.

## 2021-12-29 PROCEDURE — U0004 COV-19 TEST NON-CDC HGH THRU: HCPCS | Performed by: PERSONAL EMERGENCY RESPONSE ATTENDANT

## 2022-01-03 ENCOUNTER — TELEPHONE (OUTPATIENT)
Dept: FAMILY MEDICINE CLINIC | Facility: CLINIC | Age: 22
End: 2022-01-03

## 2022-01-03 NOTE — TELEPHONE ENCOUNTER
Caller: Marjorie Goff    Relationship: Self    Best call back number: [phone]     What is the best time to reach you: ANYTIME     Who are you requesting to speak with (clinical staff, provider,  specific staff member): CLINICAL STAFF     What was the call regarding: PATIENT STATES SHE TESTED POSITIVE FOR COVID ON 12/30/21 AND 1/2/22. PATIENT IS REQUESTING TO KNOW HOW LONG SHE WILL NEED TO BE OFF FROM WORK. PATIENT STATES SHE HAS BEEN TOLD SHE WILL NEED TO QUARANTINE 5 AND 10 DAYS AND WILL NEED TO KNOW WHICH IS CORRECT.    Do you require a callback: YES

## 2022-01-03 NOTE — TELEPHONE ENCOUNTER
Contacted patient, she states that she feels well, only symptom she is experiencing is loss of taste and smell. I advised her that she will need to get release from quarantine date from the [facility].     She verbalized understanding and agreed to follow up with [facility].

## 2022-01-27 ENCOUNTER — OFFICE VISIT (OUTPATIENT)
Dept: ORTHOPEDIC SURGERY | Facility: CLINIC | Age: 22
End: 2022-01-27

## 2022-01-27 VITALS
SYSTOLIC BLOOD PRESSURE: 118 MMHG | HEIGHT: 64 IN | DIASTOLIC BLOOD PRESSURE: 88 MMHG | BODY MASS INDEX: 24.09 KG/M2 | WEIGHT: 141.09 LBS

## 2022-01-27 DIAGNOSIS — M25.521 RIGHT ELBOW PAIN: ICD-10-CM

## 2022-01-27 DIAGNOSIS — G56.21 ULNAR NEUROPATHY AT ELBOW, RIGHT: Primary | ICD-10-CM

## 2022-01-27 PROCEDURE — 99213 OFFICE O/P EST LOW 20 MIN: CPT | Performed by: ORTHOPAEDIC SURGERY

## 2022-01-27 RX ORDER — LEVONORGESTREL AND ETHINYL ESTRADIOL 0.1-0.02MG
KIT ORAL
COMMUNITY
Start: 2021-12-20 | End: 2022-10-24

## 2022-01-27 NOTE — PROGRESS NOTES
[facility] Orthopaedic Surgery Office Follow Up       Office Follow Up Visit       Patient Name: Marjorie Goff    Chief Complaint:   Chief Complaint   Patient presents with   • Follow-up     6 week follow up; Ulnar neuropathy at elbow, right        Referring Physician: No ref. provider found    History of Present Illness:   It has been 6  week(s) since Marjorie Goff's last visit. Marjorie Goff returns to clinic today for F/U: follow-up of rightBody Part: elbowReason: pain. The issue has been ongoing for 3 month(s). Marjorie Goff rates HIS/HER: herpain at 4/10 on the pain scale. Previous/current treatments: bracing, NSAIDS and oral steroids. Current symptoms:Symptoms: same as prior visit. The pain is worse with sleeping, working and lying on affected side; using the brace improves the pain. Overall, he/she: sheis doing better.  I have reviewed the patient's history of present illness as noted/entered above.    I have reviewed the patient's past medical history, surgical history, social history, family history, medications, and allergies as noted in the electronic medical record and as noted/entered.  I have reviewed the patient's review of systems as noted/enter and updated as noted in the patient's HPI.    RIGHT elbow ulnar neuropathy  Sleeps in fetal position -- counseled on prevention of ulnar neuropathy, possible bracing if desires she can be obtained over-the-counter    1/27/2022:  Right elbow cubital tunnel syndrome, some improvements noted treated with Medrol Dosepak, bracing, NSAIDs counseled again on treatment options operative versus nonoperative treatment.    Overall improvements noted    EMG & Nerve Conduction Test     Result Date: 11/23/2021  Ulnar neuropathy at the elbow on the right, mild-moderate This report is transcribed using the Dragon dictation system.         I reviewed the findings above does have some early findings of ulnar  neuropathy the right elbow discussed with patient      Subjective   Subjective      Review of Systems   Constitutional: Positive for fatigue. Negative for chills and fever.   HENT: Positive for ear pain. Negative for congestion and dental problem.    Eyes: Negative.  Negative for blurred vision.   Respiratory: Negative.  Negative for shortness of breath.    Cardiovascular: Negative.  Negative for leg swelling.   Gastrointestinal: Negative.  Negative for abdominal pain.   Endocrine: Negative.  Negative for polyuria.   Genitourinary: Negative.  Negative for difficulty urinating.   Musculoskeletal: Positive for arthralgias.   Skin: Negative.    Allergic/Immunologic: Positive for environmental allergies and food allergies.   Neurological: Negative.    Hematological: Negative.  Negative for adenopathy.   Psychiatric/Behavioral: Negative.  Negative for behavioral problems.        Past Medical History:   Past Medical History:   Diagnosis Date   • ADHD (attention deficit hyperactivity disorder)    • Anxiety    • Depression    • PTSD (post-traumatic stress disorder)        Past Surgical History: History reviewed. No pertinent surgical history.    Family History:   Family History   Problem Relation Age of Onset   • Depression Mother    • Diabetes Maternal Grandmother    • Stroke Maternal Grandmother    • No Known Problems Father    • Breast cancer Neg Hx    • Ovarian cancer Neg Hx        Social History:   Social History     Socioeconomic History   • Marital status: Single   Tobacco Use   • Smoking status: Never Smoker   • Smokeless tobacco: Never Used   Vaping Use   • Vaping Use: Never used   Substance and Sexual Activity   • Alcohol use: Never   • Drug use: Never   • Sexual activity: Yes     Partners: Female     Birth control/protection: Pill       Medications:   Current Outpatient Medications:   •  albuterol sulfate  (90 Base) MCG/ACT inhaler, Inhale 2 puffs Every 4 (Four) Hours As Needed., Disp: , Rfl:   •  Lessina  "0.1-20 MG-MCG per tablet, , Disp: , Rfl:     Allergies:   Allergies   Allergen Reactions   • Amoxicillin Hives   • Penicillins Hives       The following portions of the patient's history were reviewed and updated as appropriate: allergies, current medications, past family history, past medical history, past social history, past surgical history and problem list.        Objective    Objective      Vital Signs:   Vitals:    01/27/22 0940   BP: 118/88   Weight: 64 kg (141 lb 1.5 oz)   Height: 162.6 cm (64.02\")       Ortho Exam:  RIGHT ELBOW  Again the symptoms remain fairly transient she is well-appearing on clinical exam SLT intact and symmetric bilaterally she has excellent strength no motor weakness.  She does have some pain with Tinel's it would be expected but overall appears better    Results Review:  Imaging Results (Last 24 Hours)     ** No results found for the last 24 hours. **          EMG & Nerve Conduction Test    Result Date: 11/23/2021  Ulnar neuropathy at the elbow on the right, mild-moderate This report is transcribed using the Dragon dictation system.         Procedures            Assessment / Plan      Assessment/Plan:   Problem List Items Addressed This Visit        Musculoskeletal and Injuries    Right elbow pain       Neuro    Ulnar neuropathy at elbow, right - Primary        Right cubital tunnel syndrome still remains fairly transient.  Counseled again on her confirmed EMG/NCV findings.  We certainly do not want her to get permanent numbness tingling or motor symptoms.  Counseled she can continue to watch this she desires to avoid surgery I think that is very reasonable.  I will see her back in 3 months as a repeat check to ensure that her findings are stable if they are stable and durable at that time then we will continue with conservative course.    Counseled again today on operative versus nonoperative measures.  Discussed young age and fairly early and transient symptoms noted.    Follow Up: " 3 months, no x-rays needed      Brian Muñoz MD, FAAOS  Orthopedic Surgeon  Fellowship Trained Shoulder and Elbow Surgeon  [facility]  Orthopedics and Sports Medicine  [facility], [address]  [place] [address]    01/27/22  10:19 EST

## 2022-01-31 ENCOUNTER — OFFICE VISIT (OUTPATIENT)
Dept: FAMILY MEDICINE CLINIC | Facility: CLINIC | Age: 22
End: 2022-01-31

## 2022-01-31 VITALS
HEART RATE: 118 BPM | TEMPERATURE: 98.2 F | WEIGHT: 136.6 LBS | DIASTOLIC BLOOD PRESSURE: 80 MMHG | OXYGEN SATURATION: 99 % | BODY MASS INDEX: 23.32 KG/M2 | HEIGHT: 64 IN | SYSTOLIC BLOOD PRESSURE: 132 MMHG

## 2022-01-31 DIAGNOSIS — M79.89 SWELLING OF BOTH HANDS: ICD-10-CM

## 2022-01-31 DIAGNOSIS — Z00.00 PHYSICAL EXAM, ANNUAL: Primary | ICD-10-CM

## 2022-01-31 DIAGNOSIS — L50.9 HIVES: ICD-10-CM

## 2022-01-31 DIAGNOSIS — R53.83 OTHER FATIGUE: ICD-10-CM

## 2022-01-31 DIAGNOSIS — Z11.59 ENCOUNTER FOR HEPATITIS C SCREENING TEST FOR LOW RISK PATIENT: ICD-10-CM

## 2022-01-31 LAB
EXPIRATION DATE: NORMAL
INTERNAL CONTROL: NORMAL
Lab: NORMAL
S PYO AG THROAT QL: NEGATIVE

## 2022-01-31 PROCEDURE — 2014F MENTAL STATUS ASSESS: CPT | Performed by: PHYSICIAN ASSISTANT

## 2022-01-31 PROCEDURE — 87880 STREP A ASSAY W/OPTIC: CPT | Performed by: PHYSICIAN ASSISTANT

## 2022-01-31 PROCEDURE — 96372 THER/PROPH/DIAG INJ SC/IM: CPT | Performed by: PHYSICIAN ASSISTANT

## 2022-01-31 PROCEDURE — 99395 PREV VISIT EST AGE 18-39: CPT | Performed by: PHYSICIAN ASSISTANT

## 2022-01-31 PROCEDURE — 3008F BODY MASS INDEX DOCD: CPT | Performed by: PHYSICIAN ASSISTANT

## 2022-01-31 RX ORDER — METHYLPREDNISOLONE 4 MG/1
TABLET ORAL
Qty: 21 EACH | Refills: 0 | Status: SHIPPED | OUTPATIENT
Start: 2022-01-31 | End: 2022-04-08

## 2022-01-31 RX ORDER — METHYLPREDNISOLONE SODIUM SUCCINATE 40 MG/ML
40 INJECTION, POWDER, LYOPHILIZED, FOR SOLUTION INTRAMUSCULAR; INTRAVENOUS ONCE
Status: COMPLETED | OUTPATIENT
Start: 2022-01-31 | End: 2022-01-31

## 2022-01-31 RX ADMIN — METHYLPREDNISOLONE SODIUM SUCCINATE 40 MG: 40 INJECTION, POWDER, LYOPHILIZED, FOR SOLUTION INTRAMUSCULAR; INTRAVENOUS at 10:06

## 2022-01-31 NOTE — PATIENT INSTRUCTIONS
Recommend zyrtec 10 mg in the morning, benadryl 50 mg nightly.  Take steroids with food.  Start oral steroids tomorrow.  Go to ER if symptoms worsen.    Hives  Hives (urticaria) are itchy, red, swollen areas on the skin. Hives can appear on any part of the body. Hives often fade within 24 hours (acute hives). Sometimes, new hives appear after old ones fade and the cycle can continue for several days or weeks (chronic hives). Hives do not spread from person to person (are not contagious).  Hives come from the body's reaction to something a person is allergic to (allergen), something that causes irritation, or various other triggers. When a person is exposed to a trigger, his or her body releases a chemical (histamine) that causes redness, itching, and swelling. Hives can appear right after exposure to a trigger or hours later.  What are the causes?  This condition may be caused by:  · Allergies to foods or ingredients.  · Insect bites or stings.  · Exposure to pollen or pets.  · Contact with latex or chemicals.  · Spending time in sunlight, heat, or cold (exposure).  · Exercise.  · Stress.  · Certain medicines.  You can also get hives from other medical conditions and treatments, such as:  · Viruses, including the common cold.  · Bacterial infections, such as urinary tract infections and strep throat.  · Certain medicines.  · Allergy shots.  · Blood transfusions.  Sometimes, the cause of this condition is not known (idiopathic hives).  What increases the risk?  You are more likely to develop this condition if you:  · Are a woman.  · Have food allergies, especially to citrus fruits, milk, eggs, peanuts, tree nuts, or shellfish.  · Are allergic to:  ? Medicines.  ? Latex.  ? Insects.  ? Animals.  ? Pollen.  What are the signs or symptoms?  Common symptoms of this condition include raised, itchy, red or white bumps or patches on your skin. These areas may:  · Become large and swollen (welts).  · Change in shape and  location, quickly and repeatedly.  · Be separate hives or connect over a large area of skin.  · Sting or become painful.  · Turn white when pressed in the center (maco).  In severe cases, your hands, feet, and face may also become swollen. This may occur if hives develop deeper in your skin.  How is this diagnosed?  This condition may be diagnosed by your symptoms, medical history, and physical exam.  · Your skin, urine, or blood may be tested to find out what is causing your hives and to rule out other health issues.  · Your health care provider may also remove a small sample of skin from the affected area and examine it under a microscope (biopsy).  How is this treated?  Treatment for this condition depends on the cause and severity of your symptoms. Your health care provider may recommend using cool, wet cloths (cool compresses) or taking cool showers to relieve itching. Treatment may include:  · Medicines that help:  ? Relieve itching (antihistamines).  ? Reduce swelling (corticosteroids).  ? Treat infection (antibiotics).  · An injectable medicine (omalizumab). Your health care provider may prescribe this if you have chronic idiopathic hives and you continue to have symptoms even after treatment with antihistamines.  Severe cases may require an emergency injection of adrenaline (epinephrine) to prevent a life-threatening allergic reaction (anaphylaxis).  Follow these instructions at home:  Medicines  · Take and apply over-the-counter and prescription medicines only as told by your health care provider.  · If you were prescribed an antibiotic medicine, take it as told by your health care provider. Do not stop using the antibiotic even if you start to feel better.  Skin care  · Apply cool compresses to the affected areas.  · Do not scratch or rub your skin.  General instructions  · Do not take hot showers or baths. This can make itching worse.  · Do not wear tight-fitting clothing.  · Use sunscreen and wear  protective clothing when you are outside.  · Avoid any substances that cause your hives. Keep a journal to help track what causes your hives. Write down:  ? What medicines you take.  ? What you eat and drink.  ? What products you use on your skin.  · Keep all follow-up visits as told by your health care provider. This is important.  Contact a health care provider if:  · Your symptoms are not controlled with medicine.  · Your joints are painful or swollen.  Get help right away if:  · You have a fever.  · You have pain in your abdomen.  · Your tongue or lips are swollen.  · Your eyelids are swollen.  · Your chest or throat feels tight.  · You have trouble breathing or swallowing.  These symptoms may represent a serious problem that is an emergency. Do not wait to see if the symptoms will go away. Get medical help right away. Call your local emergency services (911 in the [place] Do not drive yourself to the hospital.  Summary  · Hives (urticaria) are itchy, red, swollen areas on your skin. Hives come from the body's reaction to something a person is allergic to (allergen), something that causes irritation, or various other triggers.  · Treatment for this condition depends on the cause and severity of your symptoms.  · Avoid any substances that cause your hives. Keep a journal to help track what causes your hives.  · Take and apply over-the-counter and prescription medicines only as told by your health care provider.  · Keep all follow-up visits as told by your health care provider. This is important.  This information is not intended to replace advice given to you by your health care provider. Make sure you discuss any questions you have with your health care provider.  Document Revised: 07/03/2019 Document Reviewed: 07/03/2019  Elsevier Patient Education © 2021 [employer]

## 2022-01-31 NOTE — PROGRESS NOTES
Patient Care Team:  Junie Covarrubias PA-C as PCP - General (Physician Assistant)  Rohit Warren DO as Consulting Physician (Obstetrics and Gynecology)     Chief complaint: Patient is in today for a physical     Marjorie Denver is a 21 y.o. female who presents for her yearly physical exam.     Patient presents for routine annual physical exam.  She reports new onset rash that started a week ago.  She had Covid in December with no complications.  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  She denies changing any topical products, starting any new medications or taking any supplements.  She started her birth control but this was over a month ago.  She reports itching with the rash.  She denies shortness of breath, lip or tongue swelling.  She reports swelling in her hands with pain.  She is established with gynecology.       Review of Systems   Constitutional: Positive for fatigue. Negative for chills, diaphoresis and fever.   HENT: Negative for congestion, ear pain, hearing loss, postnasal drip, rhinorrhea and sore throat.    Eyes: Negative for blurred vision and pain.   Respiratory: Negative for cough, shortness of breath and wheezing.    Cardiovascular: Negative for chest pain and leg swelling.   Gastrointestinal: Negative for abdominal pain, blood in stool, constipation, diarrhea, nausea, vomiting and indigestion.   Endocrine: Negative for polyuria.   Genitourinary: Negative for dysuria, flank pain and hematuria.   Musculoskeletal: Negative for arthralgias, gait problem and myalgias.   Skin: Positive for rash. Negative for skin lesions.   Neurological: Negative for dizziness and headache.   Psychiatric/Behavioral: Positive for stress. Negative for self-injury, sleep disturbance, suicidal ideas and depressed mood. The patient is not nervous/anxious.         History  Past Medical History:   Diagnosis Date   • ADHD (attention deficit hyperactivity  disorder)    • Anxiety    • Depression    • PTSD (post-traumatic stress disorder)       History reviewed. No pertinent surgical history.   Allergies   Allergen Reactions   • Amoxicillin Hives   • Penicillins Hives      Family History   Problem Relation Age of Onset   • Depression Mother    • Diabetes Maternal Grandmother    • Stroke Maternal Grandmother    • No Known Problems Father    • Breast cancer Neg Hx    • Ovarian cancer Neg Hx       Social History     Socioeconomic History   • Marital status: Single   Tobacco Use   • Smoking status: Never Smoker   • Smokeless tobacco: Never Used   Vaping Use   • Vaping Use: Never used   Substance and Sexual Activity   • Alcohol use: Never   • Drug use: Never   • Sexual activity: Yes     Partners: Female     Birth control/protection: Pill      Current Outpatient Medications on File Prior to Visit   Medication Sig Dispense Refill   • albuterol sulfate  (90 Base) MCG/ACT inhaler Inhale 2 puffs Every 4 (Four) Hours As Needed.     • Lessina 0.1-20 MG-MCG per tablet        No current facility-administered medications on file prior to visit.       Results for orders placed or performed during the hospital encounter of 12/29/21   COVID-19 PCR, [facility], NP SWAB IN [place] VIRAL TRANSPORT MEDIA/ORAL SWISH 24-30 HR TAT - Saliva, Oral Cavity    Specimen: Oral Cavity; Saliva   Result Value Ref Range    SARS-CoV-2 KIERRA Detected (C) POS       Health Maintenance   Topic Date Due   • COVID-19 Vaccine (1) Never done   • Pneumococcal Vaccine 0-64 (1 of 2 - PPSV23) Never done   • HEPATITIS C SCREENING  Never done   • CHLAMYDIA SCREENING  Never done   • INFLUENZA VACCINE  01/31/2023 (Originally 8/1/2021)   • TDAP/TD VACCINES (2 - Td or Tdap) 05/21/2022   • ANNUAL PHYSICAL  02/01/2023   • MENINGOCOCCAL VACCINE  Completed   • HPV VACCINES  Completed       Immunization History   Administered Date(s) Administered   • DTaP, Unspecified 2001, 2001, 2001, 08/27/2002, 05/21/2005  "  • Flu Vaccine Quad PF >36MO 11/13/2018   • Hep A, 2 Dose 08/11/2017, 08/11/2017, 02/19/2018, 02/19/2018   • Hep B, Adolescent or Pediatric 2001, 2001, 02/19/2002   • Hib (PRP-D) 08/27/2002   • Hib (PRP-T) 2001, 2001, 2001   • Hpv9 05/21/2012, 08/11/2017, 08/11/2017   • IPV 2001, 2001, 02/19/2002, 05/21/2005   • Influenza LAIV (Nasal) 10/09/2009, 11/28/2012   • MMR 05/24/2002, 08/27/2002   • Meningococcal Conjugate 08/11/2017, 08/11/2017   • Tdap 05/21/2012   • Varicella 05/24/2002, 07/01/2008       Patient's Body mass index is 23.44 kg/m². indicating that she is within normal range (BMI 18.5-24.9). No BMI management plan needed..      Results for orders placed or performed during the hospital encounter of 12/29/21   COVID-19 PCR, [facility], NP SWAB IN [place] VIRAL TRANSPORT MEDIA/ORAL SWISH 24-30 HR TAT - Saliva, Oral Cavity    Specimen: Oral Cavity; Saliva   Result Value Ref Range    SARS-CoV-2 KIERRA Detected (C) POS            Vitals:    01/31/22 0837   BP: 132/80   BP Location: Left arm   Patient Position: Sitting   Cuff Size: Adult   Pulse: 118   Temp: 98.2 °F (36.8 °C)   SpO2: 99%   Weight: 62 kg (136 lb 9.6 oz)   Height: 162.6 cm (64.02\")   PainSc:   2       Body mass index is 23.44 kg/m².    Physical Exam  Vitals reviewed.   Constitutional:       General: She is not in acute distress.     Appearance: Normal appearance. She is well-developed and normal weight. She is not ill-appearing or diaphoretic.   HENT:      Head: Normocephalic and atraumatic.      Right Ear: Hearing, tympanic membrane, ear canal and external ear normal.      Left Ear: Hearing, tympanic membrane, ear canal and external ear normal.      Nose: Nose normal.      Right Sinus: No maxillary sinus tenderness or frontal sinus tenderness.      Left Sinus: No maxillary sinus tenderness or frontal sinus tenderness.      Mouth/Throat:      Pharynx: Uvula midline.   Eyes:      General: Lids are normal.      " Extraocular Movements: Extraocular movements intact.      Conjunctiva/sclera: Conjunctivae normal.   Neck:      Thyroid: No thyroid mass or thyromegaly.      Trachea: Trachea and phonation normal.   Cardiovascular:      Rate and Rhythm: Regular rhythm. Tachycardia present.      Heart sounds: Normal heart sounds.   Pulmonary:      Effort: Pulmonary effort is normal.      Breath sounds: Normal breath sounds.   Abdominal:      General: Bowel sounds are normal. There is no distension.      Palpations: Abdomen is soft. Abdomen is not rigid.      Tenderness: There is no abdominal tenderness. There is no guarding.   Musculoskeletal:         General: Normal range of motion.      Cervical back: Normal range of motion.      Right lower leg: No edema.      Left lower leg: No edema.   Lymphadenopathy:      Cervical: No cervical adenopathy.      Right cervical: No superficial cervical adenopathy.     Left cervical: No superficial cervical adenopathy.   Skin:     General: Skin is warm.      Findings: Erythema and rash present. Rash is urticarial.      Nails: There is no clubbing.          Neurological:      Mental Status: She is alert and oriented to person, place, and time.      Coordination: Coordination normal.      Gait: Gait normal.      Deep Tendon Reflexes: Reflexes are normal and symmetric.      Comments: CN grossly intact   Psychiatric:         Attention and Perception: Attention and perception normal. She is attentive.         Mood and Affect: Mood is anxious.         Speech: Speech normal.         Behavior: Behavior is agitated. Behavior is cooperative.         Thought Content: Thought content normal.         Cognition and Memory: Cognition and memory normal.         Judgment: Judgment normal.             Counseling provided on diet and nutrition and vaccinations.    Diagnoses and all orders for this visit:    1. Physical exam, annual (Primary)  PE completed  Preventative labs reviewed  Gynecology -  established  Declines flu and Covid vaccine.    2. Hives  -     Ambulatory Referral to Dermatology  -     methylPREDNISolone (MEDROL) 4 MG dose pack; Take as directed on package instructions.  Dispense: 21 each; Refill: 0  -     EBV Antibody Profile; Future  -     C-reactive Protein; Future  -     POC Rapid Strep A  -     Ambulatory Referral to Allergy  -     methylPREDNISolone sodium succinate (SOLU-Medrol) injection 40 mg  Erythematous itchy rash along bilateral lower extremities.  She reports rash on trunk as well, not appreciated as much on exam today.  Will prescribe steroid injection in office today.  Start oral steroids tomorrow.  Take zyrtec 10 mg in AM, benadryl 50 mg nightly.  Go to ER if rash progresses to SOB, lip/tongue swelling.  No new medications/supplements or products.  Has sore throat, will check for strep and EBV.  Referral to dermatology and allergist.  Strep negative.    3. Other fatigue  -     EBV Antibody Profile; Future  -     C-reactive Protein; Future    4. Encounter for hepatitis C screening test for low risk patient  -     Hepatitis C Antibody; Future    5. Swelling of both hands  -     ERIN With / DsDNA, RNP, Sjogrens A / B, Crowe; Future       Junie Covarrubias PA-C   1/31/2022   09:29 EST

## 2022-04-08 ENCOUNTER — OFFICE VISIT (OUTPATIENT)
Dept: FAMILY MEDICINE CLINIC | Facility: CLINIC | Age: 22
End: 2022-04-08

## 2022-04-08 VITALS
BODY MASS INDEX: 24.11 KG/M2 | HEART RATE: 107 BPM | WEIGHT: 141.2 LBS | DIASTOLIC BLOOD PRESSURE: 82 MMHG | HEIGHT: 64 IN | OXYGEN SATURATION: 99 % | SYSTOLIC BLOOD PRESSURE: 128 MMHG | TEMPERATURE: 98.9 F

## 2022-04-08 DIAGNOSIS — J02.9 SORE THROAT: Primary | ICD-10-CM

## 2022-04-08 DIAGNOSIS — J30.2 SEASONAL ALLERGIES: ICD-10-CM

## 2022-04-08 DIAGNOSIS — J35.8 TONSIL STONE: ICD-10-CM

## 2022-04-08 LAB
EXPIRATION DATE: NORMAL
INTERNAL CONTROL: NORMAL
Lab: NORMAL
S PYO AG THROAT QL: NEGATIVE

## 2022-04-08 PROCEDURE — 99213 OFFICE O/P EST LOW 20 MIN: CPT | Performed by: PHYSICIAN ASSISTANT

## 2022-04-08 PROCEDURE — 87880 STREP A ASSAY W/OPTIC: CPT | Performed by: PHYSICIAN ASSISTANT

## 2022-04-08 RX ORDER — AZITHROMYCIN 250 MG/1
TABLET, FILM COATED ORAL
Qty: 6 TABLET | Refills: 0 | Status: SHIPPED | OUTPATIENT
Start: 2022-04-08 | End: 2022-10-14

## 2022-04-08 RX ORDER — FEXOFENADINE HCL 180 MG/1
TABLET ORAL
COMMUNITY
Start: 2022-03-08 | End: 2022-04-08 | Stop reason: SDUPTHER

## 2022-04-08 RX ORDER — MEDROXYPROGESTERONE ACETATE 150 MG/ML
INJECTION, SUSPENSION INTRAMUSCULAR
COMMUNITY
Start: 2022-02-03

## 2022-04-08 RX ORDER — FEXOFENADINE HCL 180 MG/1
180 TABLET ORAL DAILY
Qty: 90 TABLET | Refills: 1 | Status: SHIPPED | OUTPATIENT
Start: 2022-04-08 | End: 2022-10-24

## 2022-04-08 NOTE — PROGRESS NOTES
"Chief Complaint   Patient presents with   • Sore Throat       HPI      Marjorie Goff is a 21 y.o. female who presents for Sore Throat.  Patient reports sore throat for the last few days.  She was seen at [facility] recently and they told her she had tonsillitis.  She noticed a tonsil stone and was concerned.  She states that she had some swelling in her lymph nodes but this resolved with Tylenol.  No fever, chills, facial pain, cough or difficulty swallowing.  She does report postnasal drip and is currently taking Allegra.    Past Medical History:   Diagnosis Date   • ADHD (attention deficit hyperactivity disorder)    • Anxiety    • Depression    • PTSD (post-traumatic stress disorder)        History reviewed. No pertinent surgical history.    Family History   Problem Relation Age of Onset   • Depression Mother    • Diabetes Maternal Grandmother    • Stroke Maternal Grandmother    • No Known Problems Father    • Breast cancer Neg Hx    • Ovarian cancer Neg Hx        Social History     Socioeconomic History   • Marital status: Single   Tobacco Use   • Smoking status: Never Smoker   • Smokeless tobacco: Never Used   Vaping Use   • Vaping Use: Never used   Substance and Sexual Activity   • Alcohol use: Never   • Drug use: Never   • Sexual activity: Yes     Partners: Female     Birth control/protection: Pill       Allergies   Allergen Reactions   • Amoxicillin Hives   • Penicillins Hives       ROS    Review of Systems   Constitutional: Negative for chills, fatigue and fever.   HENT: Positive for postnasal drip and sore throat. Negative for congestion, ear pain, rhinorrhea and sinus pressure.    Respiratory: Negative for cough, shortness of breath and wheezing.        Vitals:    04/08/22 0939   BP: 128/82   BP Location: Left arm   Patient Position: Sitting   Cuff Size: Adult   Pulse: 107   Temp: 98.9 °F (37.2 °C)   SpO2: 99%   Weight: 64 kg (141 lb 3.2 oz)   Height: 162.6 cm (64.02\")   PainSc: 0-No pain     Body mass index is " 24.22 kg/m².    Current Outpatient Medications on File Prior to Visit   Medication Sig Dispense Refill   • albuterol sulfate  (90 Base) MCG/ACT inhaler Inhale 2 puffs Every 4 (Four) Hours As Needed.     • Lessina 0.1-20 MG-MCG per tablet      • medroxyPROGESTERone Acetate 150 MG/ML suspension prefilled syringe      • [DISCONTINUED] fexofenadine (ALLEGRA) 180 MG tablet      • [DISCONTINUED] methylPREDNISolone (MEDROL) 4 MG dose pack Take as directed on package instructions. 21 each 0     No current facility-administered medications on file prior to visit.       Results for orders placed or performed in visit on 04/08/22   POCT rapid strep A    Specimen: Swab   Result Value Ref Range    Rapid Strep A Screen Negative Negative, VALID, INVALID, Not Performed    Internal Control Passed Passed    Lot Number [id number]     Expiration Date 10/27/23        PE    Physical Exam  Vitals reviewed.   Constitutional:       General: She is not in acute distress.     Appearance: She is well-developed. She is not ill-appearing or diaphoretic.   HENT:      Head: Normocephalic and atraumatic.      Right Ear: Hearing, tympanic membrane, ear canal and external ear normal.      Left Ear: Hearing, tympanic membrane, ear canal and external ear normal.      Nose: Nose normal.      Right Sinus: No maxillary sinus tenderness or frontal sinus tenderness.      Left Sinus: No maxillary sinus tenderness or frontal sinus tenderness.      Mouth/Throat:      Pharynx: Uvula midline. No posterior oropharyngeal erythema.      Tonsils: Tonsillar exudate present. No tonsillar abscesses.   Eyes:      General: Allergic shiner present.      Conjunctiva/sclera: Conjunctivae normal.   Musculoskeletal:         General: Normal range of motion.      Cervical back: Normal range of motion.   Skin:     General: Skin is warm.      Findings: No erythema.   Neurological:      Mental Status: She is alert and oriented to person, place, and time.   Psychiatric:          Behavior: Behavior normal.         Thought Content: Thought content normal.         Judgment: Judgment normal.          A/P    Diagnoses and all orders for this visit:    1. Sore throat (Primary)  -     POCT rapid strep A  Strep negative.    2. Tonsil stone  Handout given.  No signs of abscess, discussed symptoms to watch for.  Recommend salt water gargles.  If symptoms worsen or do not improve, return to office or call and we can refer to ENT.    3. Seasonal allergies  -     fexofenadine (ALLEGRA) 180 MG tablet; Take 1 tablet by mouth Daily.  Dispense: 90 tablet; Refill: 1         Plan of care reviewed with patient at the conclusion of today's visit. Education was provided regarding diagnosis, management and any prescribed or recommended OTC medications.  Patient verbalizes understanding of and agreement with management plan.    No follow-ups on file.     Junie Covarrubias PA-C

## 2022-04-29 ENCOUNTER — OFFICE VISIT (OUTPATIENT)
Dept: ORTHOPEDIC SURGERY | Facility: CLINIC | Age: 22
End: 2022-04-29

## 2022-04-29 VITALS
DIASTOLIC BLOOD PRESSURE: 60 MMHG | WEIGHT: 141.09 LBS | SYSTOLIC BLOOD PRESSURE: 118 MMHG | HEIGHT: 64 IN | BODY MASS INDEX: 24.09 KG/M2

## 2022-04-29 DIAGNOSIS — G56.21 ULNAR NEUROPATHY AT ELBOW, RIGHT: Primary | ICD-10-CM

## 2022-04-29 DIAGNOSIS — M25.521 RIGHT ELBOW PAIN: ICD-10-CM

## 2022-04-29 PROCEDURE — 99213 OFFICE O/P EST LOW 20 MIN: CPT | Performed by: ORTHOPAEDIC SURGERY

## 2022-04-29 RX ORDER — MELOXICAM 7.5 MG/1
TABLET ORAL
Qty: 30 TABLET | Refills: 1 | Status: SHIPPED | OUTPATIENT
Start: 2022-04-29 | End: 2022-10-14

## 2022-04-29 RX ORDER — METHYLPREDNISOLONE 4 MG/1
TABLET ORAL
Qty: 1 EACH | Refills: 0 | Status: SHIPPED | OUTPATIENT
Start: 2022-04-29 | End: 2022-10-14

## 2022-04-29 NOTE — PROGRESS NOTES
[facility] Orthopaedic Surgery Office Follow Up       Office Follow Up Visit       Patient Name: Marjorie Goff    Chief Complaint:   Chief Complaint   Patient presents with   • Follow-up     3 month recheck - Ulnar neuropathy at elbow, right        Referring Physician: No ref. provider found    History of Present Illness:   It has been 3  month(s) since Marjorie Goff's last visit. Marjorie Goff returns to clinic today for F/U: follow-up of rightBody Part: elbowReason: pain. The issue has been ongoing for 6 month(s). Marjorie Goff rates HIS/HER: herpain at 6/10 on the pain scale. Previous/current treatments: bracing, NSAIDS and oral steroids. Current symptoms:Symptoms: same as prior visit. The pain is worse with working and lying on affected side; resting, ice and heat improves the pain. Overall, he/she: sheis doing better.  I have reviewed the patient's history of present illness as noted/entered above.    I have reviewed the patient's past medical history, surgical history, social history, family history, medications, and allergies as noted in the electronic medical record and as noted/entered.  I have reviewed the patient's review of systems as noted/enter and updated as noted in the patient's HPI.    RIGHT elbow ulnar neuropathy  Sleeps in fetal position -- counseled on prevention of ulnar neuropathy, possible bracing if desires she can be obtained over-the-counter     1/27/2022:  Right elbow cubital tunnel syndrome, some improvements noted treated with Medrol Dosepak, bracing, NSAIDs counseled again on treatment options operative versus nonoperative treatment.     Overall improvements noted     EMG & Nerve Conduction Test     Result Date: 11/23/2021  Ulnar neuropathy at the elbow on the right, mild-moderate This report is transcribed using the Dragon dictation system.         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  No permanent or persistent numbness and tingling still transient and positional.  Patient does to stick with conservative course and I agree.    Subjective   Subjective      Review of Systems   Constitutional: Negative.  Negative for chills, fatigue and fever.   HENT: Negative.  Negative for congestion and dental problem.    Eyes: Negative.  Negative for blurred vision.   Respiratory: Negative.  Negative for shortness of breath.    Cardiovascular: Negative.  Negative for leg swelling.   Gastrointestinal: Negative.  Negative for abdominal pain.   Endocrine: Negative.  Negative for polyuria.   Genitourinary: Negative.  Negative for difficulty urinating.   Musculoskeletal: Positive for arthralgias.   Skin: Negative.    Allergic/Immunologic: Negative.    Neurological: Negative.    Hematological: Negative.  Negative for adenopathy.   Psychiatric/Behavioral: Negative.  Negative for behavioral problems.        Past Medical History:   Past Medical History:   Diagnosis Date   • ADHD (attention deficit hyperactivity disorder)    • Anxiety    • Depression    • PTSD (post-traumatic stress disorder)        Past Surgical History: No past surgical history on file.    Family History:   Family History   Problem Relation Age of Onset   • Depression Mother    • Diabetes Maternal Grandmother    • Stroke Maternal Grandmother    • No Known Problems Father    • Breast cancer Neg Hx    • Ovarian cancer Neg Hx        Social History:   Social History     Socioeconomic History   • Marital status: Single   Tobacco Use   • Smoking status: Never Smoker   • Smokeless tobacco: Never Used   Vaping Use   • Vaping Use:  "Never used   Substance and Sexual Activity   • Alcohol use: Never   • Drug use: Never   • Sexual activity: Yes     Partners: Female     Birth control/protection: Pill       Medications:   Current Outpatient Medications:   •  albuterol sulfate  (90 Base) MCG/ACT inhaler, Inhale 2 puffs Every 4 (Four) Hours As Needed., Disp: , Rfl:   •  azithromycin (Zithromax Z-Rory) 250 MG tablet, Take 2 tablets the first day, then 1 tablet daily for 4 days., Disp: 6 tablet, Rfl: 0  •  fexofenadine (ALLEGRA) 180 MG tablet, Take 1 tablet by mouth Daily., Disp: 90 tablet, Rfl: 1  •  Lessina 0.1-20 MG-MCG per tablet, , Disp: , Rfl:   •  medroxyPROGESTERone Acetate 150 MG/ML suspension prefilled syringe, , Disp: , Rfl:   •  meloxicam (MOBIC) 7.5 MG tablet, 1 Oral Daily with food., Disp: 30 tablet, Rfl: 1  •  methylPREDNISolone (MEDROL) 4 MG dose pack, Use as directed by package instructions, Disp: 1 each, Rfl: 0    Allergies:   Allergies   Allergen Reactions   • Amoxicillin Hives   • Penicillins Hives       The following portions of the patient's history were reviewed and updated as appropriate: allergies, current medications, past family history, past medical history, past social history, past surgical history and problem list.        Objective    Objective      Vital Signs:   Vitals:    04/29/22 0909   BP: 118/60   Weight: 64 kg (141 lb 1.5 oz)   Height: 162.6 cm (64.02\")       Ortho Exam:  Right elbow negative Tinel's today numbness and tingling of the ulnar digits is absent at this time and symmetric to the contralateral side.    Negative muscle atrophy    5-5 strength throughout the wrist and hand    With prolonged elbow flexion and wrist flexion she can see some ulnar nerve symptoms noted subjectively but overall improvements are noted    Results Review:  Imaging Results (Last 24 Hours)     ** No results found for the last 24 hours. **          Procedures            Assessment / Plan      Assessment/Plan:   Problem List " Items Addressed This Visit        Musculoskeletal and Injuries    Right elbow pain    Relevant Medications    methylPREDNISolone (MEDROL) 4 MG dose pack    meloxicam (MOBIC) 7.5 MG tablet       Neuro    Ulnar neuropathy at elbow, right - Primary    Relevant Medications    methylPREDNISolone (MEDROL) 4 MG dose pack    meloxicam (MOBIC) 7.5 MG tablet        Counseled again today on bracing, Medrol Dosepak provided and meloxicam to be utilized if she has any flares.  Counseled if she starts to get persistent numbness or tingling or becomes more frequent to alert me and come back in for follow-up as soon as possible.  Counseled it would be unlikely proceed with the surgery given her young age and transient symptoms.  I think she will continue to see improvements.  Counseled her on the diagnosis and treatment options again including operative versus nonoperative measures.  She will keep me updated pending progress.    Follow Up: She will keep me updated on symptoms.      Brian Muñoz MD, FAAOS  Orthopedic Surgeon  Fellowship Trained Shoulder and Elbow Surgeon  [facility]  Orthopedics and Sports Medicine  [address] [place] [address], [address]  [place] [address]    04/29/22  09:27 EDT

## 2022-05-12 ENCOUNTER — OFFICE VISIT (OUTPATIENT)
Dept: FAMILY MEDICINE CLINIC | Facility: CLINIC | Age: 22
End: 2022-05-12

## 2022-05-12 VITALS
SYSTOLIC BLOOD PRESSURE: 110 MMHG | WEIGHT: 140.4 LBS | BODY MASS INDEX: 23.97 KG/M2 | HEIGHT: 64 IN | DIASTOLIC BLOOD PRESSURE: 76 MMHG | OXYGEN SATURATION: 97 % | TEMPERATURE: 98.7 F | HEART RATE: 119 BPM

## 2022-05-12 DIAGNOSIS — J30.2 SEASONAL ALLERGIES: ICD-10-CM

## 2022-05-12 DIAGNOSIS — J02.9 SORE THROAT: Primary | ICD-10-CM

## 2022-05-12 PROCEDURE — 99213 OFFICE O/P EST LOW 20 MIN: CPT | Performed by: PHYSICIAN ASSISTANT

## 2022-05-12 RX ORDER — FLUTICASONE PROPIONATE 50 MCG
2 SPRAY, SUSPENSION (ML) NASAL DAILY
Qty: 18.2 ML | Refills: 5 | Status: SHIPPED | OUTPATIENT
Start: 2022-05-12 | End: 2022-10-24

## 2022-05-12 NOTE — PROGRESS NOTES
"Chief Complaint   Patient presents with   • Sore Throat       HPI      Marjorie Goff is a 21 y.o. female who presents for Sore Throat.  Patient reports sore throat for the last few days.  Difficulty swallowing.  Taking allegra daily.  Did have a lot of dry coughing last night and has nasal drainage.  Not using any nasal sprays.  Recently treated with azithromycin in early April for sore throat with resolution.      Past Medical History:   Diagnosis Date   • ADHD (attention deficit hyperactivity disorder)    • Anxiety    • Depression    • PTSD (post-traumatic stress disorder)        History reviewed. No pertinent surgical history.    Family History   Problem Relation Age of Onset   • Depression Mother    • Diabetes Maternal Grandmother    • Stroke Maternal Grandmother    • No Known Problems Father    • Breast cancer Neg Hx    • Ovarian cancer Neg Hx        Social History     Socioeconomic History   • Marital status: Single   Tobacco Use   • Smoking status: Never Smoker   • Smokeless tobacco: Never Used   Vaping Use   • Vaping Use: Never used   Substance and Sexual Activity   • Alcohol use: Never   • Drug use: Never   • Sexual activity: Yes     Partners: Female     Birth control/protection: Pill       Allergies   Allergen Reactions   • Amoxicillin Hives   • Penicillins Hives       ROS    Review of Systems   Constitutional: Negative for chills and fever.   HENT: Positive for congestion, postnasal drip, rhinorrhea and sore throat. Negative for ear pain and sinus pressure.    Respiratory: Positive for cough. Negative for shortness of breath and wheezing.    Musculoskeletal: Negative for myalgias.   Neurological: Negative for dizziness and headache.       Vitals:    05/12/22 1503   BP: 110/76   BP Location: Left arm   Patient Position: Sitting   Cuff Size: Adult   Pulse: 119   Temp: 98.7 °F (37.1 °C)   SpO2: 97%   Weight: 63.7 kg (140 lb 6.4 oz)   Height: 162.6 cm (64.02\")   PainSc:   4     Body mass index is 24.09 " kg/m².    Current Outpatient Medications on File Prior to Visit   Medication Sig Dispense Refill   • albuterol sulfate  (90 Base) MCG/ACT inhaler Inhale 2 puffs Every 4 (Four) Hours As Needed.     • azithromycin (Zithromax Z-Rory) 250 MG tablet Take 2 tablets the first day, then 1 tablet daily for 4 days. 6 tablet 0   • fexofenadine (ALLEGRA) 180 MG tablet Take 1 tablet by mouth Daily. 90 tablet 1   • Lessina 0.1-20 MG-MCG per tablet      • medroxyPROGESTERone Acetate 150 MG/ML suspension prefilled syringe      • meloxicam (MOBIC) 7.5 MG tablet 1 Oral Daily with food. 30 tablet 1   • methylPREDNISolone (MEDROL) 4 MG dose pack Use as directed by package instructions 1 each 0     No current facility-administered medications on file prior to visit.       Results for orders placed or performed in visit on 04/08/22   POCT rapid strep A    Specimen: Swab   Result Value Ref Range    Rapid Strep A Screen Negative Negative, VALID, INVALID, Not Performed    Internal Control Passed Passed    Lot Number [id number]     Expiration Date 10/27/23        PE    Physical Exam  Vitals reviewed.   Constitutional:       General: She is not in acute distress.     Appearance: She is well-developed. She is not ill-appearing or diaphoretic.   HENT:      Head: Normocephalic and atraumatic.      Right Ear: Hearing, tympanic membrane, ear canal and external ear normal.      Left Ear: Hearing, tympanic membrane, ear canal and external ear normal.      Nose: Congestion and rhinorrhea present.      Right Sinus: No maxillary sinus tenderness or frontal sinus tenderness.      Left Sinus: No maxillary sinus tenderness or frontal sinus tenderness.      Mouth/Throat:      Lips: Pink.      Mouth: Mucous membranes are moist. No oral lesions.      Pharynx: Uvula midline. No oropharyngeal exudate or posterior oropharyngeal erythema.      Tonsils: No tonsillar abscesses.   Eyes:      General: Allergic shiner present.      Conjunctiva/sclera: Conjunctivae  normal.   Cardiovascular:      Rate and Rhythm: Normal rate and regular rhythm.      Heart sounds: Normal heart sounds. No murmur heard.    No friction rub. No gallop.   Pulmonary:      Effort: Pulmonary effort is normal. No respiratory distress.      Breath sounds: Normal breath sounds.   Musculoskeletal:         General: Normal range of motion.      Cervical back: Normal range of motion.   Lymphadenopathy:      Cervical: Cervical adenopathy present.      Right cervical: Superficial cervical adenopathy present.   Skin:     General: Skin is warm.      Findings: No erythema.   Neurological:      Mental Status: She is alert and oriented to person, place, and time.   Psychiatric:         Behavior: Behavior normal.         Thought Content: Thought content normal.         Judgment: Judgment normal.          A/P    Diagnoses and all orders for this visit:    1. Sore throat (Primary)    2. Seasonal allergies  -     fluticasone (Flonase) 50 MCG/ACT nasal spray; 2 sprays into the nostril(s) as directed by provider Daily.  Dispense: 18.2 mL; Refill: 5    Suspect that sore throat is related to posterior nasal drainage and dry cough.  Recommend additional treatment for allergies with Flonase daily and Benadryl at night for at least 10 days.  Continue on Allegra during the day.  Symptomatic treatment for sore throat including salt water gargles, cough drops and Chloraseptic spray.  Call if symptoms worsen or do not improve.    Plan of care reviewed with patient at the conclusion of today's visit. Education was provided regarding diagnosis, management and any prescribed or recommended OTC medications.  Patient verbalizes understanding of and agreement with management plan.    No follow-ups on file.     Junie Covarrubias PA-C

## 2022-05-12 NOTE — PATIENT INSTRUCTIONS
Take 1 allegra in the morning.  Take 1 tablet of benadryl 25 mg at night.  Flonase 2 sprays once a day.

## 2022-10-14 ENCOUNTER — OFFICE VISIT (OUTPATIENT)
Dept: ORTHOPEDIC SURGERY | Facility: CLINIC | Age: 22
End: 2022-10-14

## 2022-10-14 VITALS
WEIGHT: 143 LBS | HEIGHT: 64 IN | DIASTOLIC BLOOD PRESSURE: 79 MMHG | BODY MASS INDEX: 24.41 KG/M2 | SYSTOLIC BLOOD PRESSURE: 111 MMHG

## 2022-10-14 DIAGNOSIS — G56.21 ULNAR NEUROPATHY AT ELBOW, RIGHT: Primary | ICD-10-CM

## 2022-10-14 DIAGNOSIS — M25.521 RIGHT ELBOW PAIN: ICD-10-CM

## 2022-10-14 PROCEDURE — 99213 OFFICE O/P EST LOW 20 MIN: CPT | Performed by: ORTHOPAEDIC SURGERY

## 2022-10-14 RX ORDER — METHYLPREDNISOLONE 4 MG/1
TABLET ORAL
Qty: 1 EACH | Refills: 0 | Status: SHIPPED | OUTPATIENT
Start: 2022-10-14 | End: 2022-10-24

## 2022-10-14 RX ORDER — IBUPROFEN 800 MG/1
800 TABLET ORAL EVERY 6 HOURS PRN
COMMUNITY
End: 2022-10-14

## 2022-10-14 RX ORDER — MELOXICAM 7.5 MG/1
TABLET ORAL
Qty: 30 TABLET | Refills: 1 | Status: SHIPPED | OUTPATIENT
Start: 2022-10-14 | End: 2022-10-24

## 2022-10-14 NOTE — PROGRESS NOTES
[facility] [place] [facility] [place]       Office Follow Up Visit       Patient Name: Marjorie Goff    Chief Complaint:   Chief Complaint   Patient presents with   • Follow-up     Right Elbow Pain / Ulnar neuropathy at elbow       Referring Physician: No ref. provider found    History of Present Illness:   It has been 5  month(s) since Marjorie Goff's last visit. Marjorie Goff returns to clinic today for F/U: follow-up of rightBody Part: elbowReason: ulnar neuropathy and pain. The issue has been ongoing for 11 month(s). Marjorie Goff rates HIS/HER: herpain at 6/10 on the pain scale. Previous/current treatments: NSAIDS, oral steroids, and compression sleeve. Current symptoms:Symptoms: pain and stiffness. The pain is worse with working, lying on affected side and elbow flexion; resting improves the pain. Overall, he/she: sheis doing worse possibly due to increased activity.  I have reviewed the patient's history of present illness as noted/entered above.    I have reviewed the patient's past medical history, surgical history, social history, family history, medications, and allergies as noted in the electronic medical record and as noted/entered.  I have reviewed the patient's review of systems as noted/enter and updated as noted in the patient's HPI.    RIGHT elbow ulnar neuropathy  Sleeps in fetal position -- counseled on prevention of ulnar neuropathy, possible bracing if desires she can be obtained over-the-counter     1/27/2022:  Right elbow cubital tunnel syndrome, some improvements noted treated with Medrol Dosepak, bracing, NSAIDs counseled again on treatment options operative versus nonoperative treatment.     Overall improvements noted     EMG & Nerve Conduction Test     Result Date: 11/23/2021  Ulnar neuropathy at the elbow on the right, mild-moderate This report is transcribed using the Dragon dictation system.         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  No permanent or persistent numbness and tingling still transient and positional.  Patient does to stick with conservative course and I agree.    10/14/2022:  Right elbow symptoms still having symptoms symptoms especially with bending and job duties  Counseled again on young age and young age for any type of surgical release.    Doesn't feel it's bad enough for surgery, etc. But desired repeat evaluation    Discussed her hypermobility and some counseling on prevention of ulnar nerve symptoms she does not have consistent numbness and tingling but does have periods where she gets radiating pain      Subjective   Subjective      Review of Systems   Respiratory: Positive for shortness of breath.    Musculoskeletal: Positive for arthralgias and back pain.   Allergic/Immunologic: Positive for environmental allergies and food allergies.   Neurological: Positive for headache.   Hematological: Bruises/bleeds easily.   Psychiatric/Behavioral: The patient is nervous/anxious.    All other systems reviewed and are negative.       Past Medical History:   Past Medical History:   Diagnosis Date   • ADHD (attention deficit hyperactivity disorder)    • Anxiety    • Depression    • PTSD (post-traumatic stress disorder)        Past Surgical History: No past surgical history on file.    Family History:   Family History   Problem Relation Age of Onset   • Depression Mother    • Diabetes Maternal Grandmother    • Stroke Maternal Grandmother    • No Known Problems Father    • Breast cancer Neg Hx    • Ovarian cancer Neg Hx        Social History:   Social  "History     Socioeconomic History   • Marital status: Single   Tobacco Use   • Smoking status: Never   • Smokeless tobacco: Never   Vaping Use   • Vaping Use: Never used   Substance and Sexual Activity   • Alcohol use: Never   • Drug use: Never   • Sexual activity: Yes     Partners: Female     Birth control/protection: Pill       Medications:   Current Outpatient Medications:   •  albuterol sulfate  (90 Base) MCG/ACT inhaler, Inhale 2 puffs Every 4 (Four) Hours As Needed., Disp: , Rfl:   •  fexofenadine (ALLEGRA) 180 MG tablet, Take 1 tablet by mouth Daily., Disp: 90 tablet, Rfl: 1  •  fluticasone (Flonase) 50 MCG/ACT nasal spray, 2 sprays into the nostril(s) as directed by provider Daily., Disp: 18.2 mL, Rfl: 5  •  Lessina 0.1-20 MG-MCG per tablet, , Disp: , Rfl:   •  medroxyPROGESTERone Acetate 150 MG/ML suspension prefilled syringe, , Disp: , Rfl:   •  meloxicam (MOBIC) 7.5 MG tablet, 1 Oral Daily with food., Disp: 30 tablet, Rfl: 1  •  methylPREDNISolone (MEDROL) 4 MG dose pack, Use as directed by package instructions, Disp: 1 each, Rfl: 0    Allergies:   Allergies   Allergen Reactions   • Amoxicillin Hives   • Penicillins Hives       The following portions of the patient's history were reviewed and updated as appropriate: allergies, current medications, past family history, past medical history, past social history, past surgical history and problem list.        Objective    Objective      Vital Signs:   Vitals:    10/14/22 0947   BP: 111/79   Weight: 64.9 kg (143 lb)   Height: 162.6 cm (64.02\")       Ortho Exam:  Right elbow does have positive Tinel's today and tingling ulnar nerve distribution no ulnar nerve subluxation.  Negative muscle atrophy good strength throughout and only occasional numbness and tingling distally.    Results Review:  Imaging Results (Last 24 Hours)     ** No results found for the last 24 hours. **          Procedures            Assessment / Plan      Assessment/Plan:   Problem " List Items Addressed This Visit        Musculoskeletal and Injuries    Right elbow pain    Relevant Medications    methylPREDNISolone (MEDROL) 4 MG dose pack    meloxicam (MOBIC) 7.5 MG tablet       Neuro    Ulnar neuropathy at elbow, right - Primary    Relevant Medications    methylPREDNISolone (MEDROL) 4 MG dose pack    meloxicam (MOBIC) 7.5 MG tablet       Right refractory ulnar neuropathy.  Counseled on cubital tunnel syndrome counseled on activity modifications counseled on Medrol Dosepak followed by meloxicam.  She will make some modifications with phone use and ergonomic changes.    Follow Up: 6 weeks to reassess right elbow no imaging needed      Brian Muñoz MD, FAAOS  Orthopedic Surgeon  Fellowship Trained Shoulder and Elbow Surgeon  [facility]  Orthopedics and Sports Medicine  [address] [place] [address], [address]  [place] [address]    10/14/22  10:12 EDT

## 2022-10-24 ENCOUNTER — LAB (OUTPATIENT)
Dept: LAB | Facility: HOSPITAL | Age: 22
End: 2022-10-24

## 2022-10-24 ENCOUNTER — OFFICE VISIT (OUTPATIENT)
Dept: FAMILY MEDICINE CLINIC | Facility: CLINIC | Age: 22
End: 2022-10-24

## 2022-10-24 VITALS
SYSTOLIC BLOOD PRESSURE: 115 MMHG | OXYGEN SATURATION: 98 % | BODY MASS INDEX: 24.89 KG/M2 | HEIGHT: 64 IN | DIASTOLIC BLOOD PRESSURE: 80 MMHG | HEART RATE: 122 BPM | TEMPERATURE: 97.8 F | WEIGHT: 145.8 LBS

## 2022-10-24 DIAGNOSIS — R00.0 TACHYCARDIA: ICD-10-CM

## 2022-10-24 DIAGNOSIS — R00.2 PALPITATIONS: ICD-10-CM

## 2022-10-24 DIAGNOSIS — R00.0 TACHYCARDIA: Primary | ICD-10-CM

## 2022-10-24 DIAGNOSIS — R79.89 ABNORMAL CBC: ICD-10-CM

## 2022-10-24 PROCEDURE — 83540 ASSAY OF IRON: CPT

## 2022-10-24 PROCEDURE — 84466 ASSAY OF TRANSFERRIN: CPT

## 2022-10-24 PROCEDURE — 93000 ELECTROCARDIOGRAM COMPLETE: CPT | Performed by: PHYSICIAN ASSISTANT

## 2022-10-24 PROCEDURE — 99213 OFFICE O/P EST LOW 20 MIN: CPT | Performed by: PHYSICIAN ASSISTANT

## 2022-10-24 PROCEDURE — 84443 ASSAY THYROID STIM HORMONE: CPT

## 2022-10-24 PROCEDURE — 80053 COMPREHEN METABOLIC PANEL: CPT

## 2022-10-24 PROCEDURE — 85027 COMPLETE CBC AUTOMATED: CPT

## 2022-10-24 NOTE — PROGRESS NOTES
Chief Complaint   Patient presents with   • Irregular Heart Beat     PT said that she has had irregular heart beats and has been recording them on her phone. PT said its been going on since January          Marjorie Goff is a 22 y.o. female who presents for Irregular Heart Beat (PT said that she has had irregular heart beats and has been recording them on her phone. PT said its been going on since January )    Patient reports episodes of fast heartbeats.  She states that she will be at rest in bed and noticed that her heart is beating really hard and fast.  She will check on her apple watch and will say that she has an elevated heart rate.  She denies any caffeine intake or use of her albuterol inhaler prior to these episodes.  She feels some chest pressure and shortness of air when this occurs but is unsure if this is related to anxiety.  She has never had a Holter monitor.    Past Medical History:   Diagnosis Date   • ADHD (attention deficit hyperactivity disorder)    • Anxiety    • Depression    • PTSD (post-traumatic stress disorder)        History reviewed. No pertinent surgical history.    Family History   Problem Relation Age of Onset   • Depression Mother    • Diabetes Maternal Grandmother    • Stroke Maternal Grandmother    • No Known Problems Father    • Breast cancer Neg Hx    • Ovarian cancer Neg Hx        Social History     Socioeconomic History   • Marital status: Single   Tobacco Use   • Smoking status: Never   • Smokeless tobacco: Never   Vaping Use   • Vaping Use: Never used   Substance and Sexual Activity   • Alcohol use: Never   • Drug use: Never   • Sexual activity: Yes     Partners: Female     Birth control/protection: Pill       Allergies   Allergen Reactions   • Amoxicillin Hives   • Penicillins Hives       ROS    Review of Systems   Constitutional: Negative for chills and fever.   Respiratory: Positive for chest tightness and shortness of breath. Negative for cough.    Cardiovascular:  "Positive for palpitations. Negative for chest pain.   Neurological: Negative for dizziness.   Psychiatric/Behavioral: The patient is nervous/anxious.        Vitals:    10/24/22 1530   BP: 115/80   Pulse: (!) 122   Temp: 97.8 °F (36.6 °C)   SpO2: 98%   Weight: 66.1 kg (145 lb 12.8 oz)   Height: 162.6 cm (64.02\")     Body mass index is 25.01 kg/m².    Current Outpatient Medications on File Prior to Visit   Medication Sig Dispense Refill   • albuterol sulfate  (90 Base) MCG/ACT inhaler Inhale 2 puffs Every 4 (Four) Hours As Needed.     • medroxyPROGESTERone Acetate 150 MG/ML suspension prefilled syringe      • [DISCONTINUED] fexofenadine (ALLEGRA) 180 MG tablet Take 1 tablet by mouth Daily. 90 tablet 1   • [DISCONTINUED] fluticasone (Flonase) 50 MCG/ACT nasal spray 2 sprays into the nostril(s) as directed by provider Daily. 18.2 mL 5   • [DISCONTINUED] Lessina 0.1-20 MG-MCG per tablet      • [DISCONTINUED] meloxicam (MOBIC) 7.5 MG tablet 1 Oral Daily with food. 30 tablet 1   • [DISCONTINUED] methylPREDNISolone (MEDROL) 4 MG dose pack Use as directed by package instructions 1 each 0     No current facility-administered medications on file prior to visit.       Results for orders placed or performed in visit on 04/08/22   POCT rapid strep A    Specimen: Swab   Result Value Ref Range    Rapid Strep A Screen Negative Negative, VALID, INVALID, Not Performed    Internal Control Passed Passed    Lot Number [id number]     Expiration Date 10/27/23          PE    Physical Exam  Vitals reviewed.   Constitutional:       General: She is not in acute distress.     Appearance: Normal appearance. She is well-developed and normal weight. She is not ill-appearing or diaphoretic.   HENT:      Head: Normocephalic and atraumatic.   Eyes:      Extraocular Movements: Extraocular movements intact.      Conjunctiva/sclera: Conjunctivae normal.   Cardiovascular:      Rate and Rhythm: Normal rate and regular rhythm.      Heart sounds: Normal " heart sounds.   Pulmonary:      Effort: No respiratory distress.   Musculoskeletal:         General: Normal range of motion.      Cervical back: Normal range of motion.   Neurological:      General: No focal deficit present.      Mental Status: She is alert.   Psychiatric:         Attention and Perception: Attention and perception normal. She is attentive.         Mood and Affect: Affect normal. Mood is anxious.         Speech: Speech normal.         Behavior: Behavior normal. Behavior is cooperative.         Thought Content: Thought content normal.         Cognition and Memory: Cognition and memory normal.         Judgment: Judgment normal.         ECG 12 Lead    Date/Time: 10/24/2022 3:58 PM  Performed by: Junie Covarrubias PA-C  Authorized by: Junie Covarrubias PA-C   Previous ECG: no previous ECG available  Rhythm: sinus rhythm    Clinical impression: normal ECG          A/P    Diagnoses and all orders for this visit:    1. Tachycardia (Primary)  -     CBC (No Diff); Future  -     Comprehensive Metabolic Panel; Future  -     TSH Rfx On Abnormal To Free T4; Future  -     Ambulatory Referral to [facility] [place] - [place]  -     ECG 12 Lead    2. Palpitations  -     Ambulatory Referral to [facility] [place] - [place]  -     ECG 12 Lead    RRR on exam.  ECG is normal and reassuring.  Will refer to heart and valve clinic for further evaluation and management.  May need holter monitor.  Discussed that he can caffeine, using the albuterol inhaler and anxiety can all cause tachycardia.  We will check labs.    Plan of care reviewed with patient at the conclusion of today's visit. Education was provided regarding diagnosis, management and any prescribed or recommended OTC medications.  Patient verbalizes understanding of and agreement with management plan.    Dictated Utilizing Dragon Dictation     Please note that portions of this note were completed with a voice recognition program.      Part of this note may be an electronic transcription/translation of spoken language to printed text using the Dragon Dictation System.    No follow-ups on file.     Junie Covarrubias PA-C

## 2022-10-25 LAB
ALBUMIN SERPL-MCNC: 4.6 G/DL (ref 3.5–5.2)
ALBUMIN/GLOB SERPL: 1.8 G/DL
ALP SERPL-CCNC: 57 U/L (ref 39–117)
ALT SERPL W P-5'-P-CCNC: 14 U/L (ref 1–33)
ANION GAP SERPL CALCULATED.3IONS-SCNC: 11.1 MMOL/L (ref 5–15)
AST SERPL-CCNC: 21 U/L (ref 1–32)
BILIRUB SERPL-MCNC: 0.7 MG/DL (ref 0–1.2)
BUN SERPL-MCNC: 11 MG/DL (ref 6–20)
BUN/CREAT SERPL: 13.6 (ref 7–25)
CALCIUM SPEC-SCNC: 8.6 MG/DL (ref 8.6–10.5)
CHLORIDE SERPL-SCNC: 105 MMOL/L (ref 98–107)
CO2 SERPL-SCNC: 23.9 MMOL/L (ref 22–29)
CREAT SERPL-MCNC: 0.81 MG/DL (ref 0.57–1)
DEPRECATED RDW RBC AUTO: 35.3 FL (ref 37–54)
EGFRCR SERPLBLD CKD-EPI 2021: 106.1 ML/MIN/1.73
ERYTHROCYTE [DISTWIDTH] IN BLOOD BY AUTOMATED COUNT: 12.3 % (ref 12.3–15.4)
GLOBULIN UR ELPH-MCNC: 2.5 GM/DL
GLUCOSE SERPL-MCNC: 88 MG/DL (ref 65–99)
HCT VFR BLD AUTO: 38.2 % (ref 34–46.6)
HGB BLD-MCNC: 12.8 G/DL (ref 12–15.9)
MCH RBC QN AUTO: 26.4 PG (ref 26.6–33)
MCHC RBC AUTO-ENTMCNC: 33.5 G/DL (ref 31.5–35.7)
MCV RBC AUTO: 78.9 FL (ref 79–97)
PLATELET # BLD AUTO: 235 10*3/MM3 (ref 140–450)
PMV BLD AUTO: 11.6 FL (ref 6–12)
POTASSIUM SERPL-SCNC: 4.7 MMOL/L (ref 3.5–5.2)
PROT SERPL-MCNC: 7.1 G/DL (ref 6–8.5)
RBC # BLD AUTO: 4.84 10*6/MM3 (ref 3.77–5.28)
SODIUM SERPL-SCNC: 140 MMOL/L (ref 136–145)
TSH SERPL DL<=0.05 MIU/L-ACNC: 1.56 UIU/ML (ref 0.27–4.2)
WBC NRBC COR # BLD: 7.55 10*3/MM3 (ref 3.4–10.8)

## 2022-10-26 DIAGNOSIS — R79.89 ABNORMAL CBC: Primary | ICD-10-CM

## 2022-10-26 LAB
IRON 24H UR-MRATE: 81 MCG/DL (ref 37–145)
IRON SATN MFR SERPL: 20 % (ref 20–50)
TIBC SERPL-MCNC: 405 MCG/DL (ref 298–536)
TRANSFERRIN SERPL-MCNC: 272 MG/DL (ref 200–360)

## 2022-10-28 ENCOUNTER — HOSPITAL ENCOUNTER (OUTPATIENT)
Dept: CARDIOLOGY | Facility: HOSPITAL | Age: 22
Discharge: HOME | End: 2022-10-28

## 2022-10-28 ENCOUNTER — OFFICE VISIT (OUTPATIENT)
Dept: CARDIOLOGY | Facility: HOSPITAL | Age: 22
End: 2022-10-28

## 2022-10-28 VITALS
WEIGHT: 144.3 LBS | SYSTOLIC BLOOD PRESSURE: 127 MMHG | BODY MASS INDEX: 24.63 KG/M2 | HEART RATE: 136 BPM | DIASTOLIC BLOOD PRESSURE: 80 MMHG | TEMPERATURE: 97.2 F | RESPIRATION RATE: 18 BRPM | HEIGHT: 64 IN | OXYGEN SATURATION: 96 %

## 2022-10-28 DIAGNOSIS — R00.2 PALPITATIONS: Primary | ICD-10-CM

## 2022-10-28 DIAGNOSIS — R00.2 PALPITATIONS: ICD-10-CM

## 2022-10-28 DIAGNOSIS — R42 DIZZINESS: ICD-10-CM

## 2022-10-28 DIAGNOSIS — R00.0 TACHYCARDIA: ICD-10-CM

## 2022-10-28 DIAGNOSIS — F41.9 ANXIETY AND DEPRESSION: ICD-10-CM

## 2022-10-28 DIAGNOSIS — F32.A ANXIETY AND DEPRESSION: ICD-10-CM

## 2022-10-28 PROCEDURE — 93246 EXT ECG>7D<15D RECORDING: CPT

## 2022-10-28 PROCEDURE — 99204 OFFICE O/P NEW MOD 45 MIN: CPT | Performed by: NURSE PRACTITIONER

## 2022-10-28 RX ORDER — PROPRANOLOL HYDROCHLORIDE 10 MG/1
10 TABLET ORAL 2 TIMES DAILY
Qty: 60 TABLET | Refills: 1 | Status: SHIPPED | OUTPATIENT
Start: 2022-10-28 | End: 2022-12-20 | Stop reason: SDUPTHER

## 2022-10-28 NOTE — PROGRESS NOTES
[facility] Heart Monitor Documentation    Marjorie Goff  2000  [id number]  10/28/22      [] ZIO XT Patch  Model [id number] Prescribed for  Days    · Serial Number: (N + 9 Digits) N   · Apply-By Date on Box:   · USPS Tracking Number:   · USPS Tracking        [] [employer] MINI PLUS Mobile Cardiac Telemetry  Model BGMINIPLUS Prescribed for  Days    · Serial Number: (BGM + 7 Digits) BGM  · Shipped-By Date on Box:   · UPS Tracking Number: [id number]  · UPS Tracking      [x] [employer] MINI Holter Monitor  Model BGMINIEL Prescribed for 14 Days    · Serial Number: (7 Digits) [id number]  · Shipped-By Date on Box: 10/24/2022  · UPS Tracking Number: [id number]  · UPS Tracking        This monitor was applied to the patient's chest and checked for proper functioning.  Ms. Marjorie Goff was instructed in the proper use of this monitor.  She was given the opportunity to ask questions and left the office with the device 's instruction manual.    Ankita Roper, [facility], 14:35 EDT, 10/28/22                  [facility] 8[facility]

## 2022-10-28 NOTE — PROGRESS NOTES
"[facility], [facility] Heart and Vascular    Chief Complaint  Palpitations and Rapid Heart Rate    Subjective    History of Present Illness {CC  Problem List  Visit  Diagnosis   Encounters  Notes  Medications  Labs  Result Review Imaging  Media :23}     Marjorie Goff presents to [facility] CARDIOLOGY for   History of Present Illness     22-year-old female with exercise induced asthma. Hx of anxiety/dression, working with therapist.   Uses inhaler intermittent.   Presents today for evaluation of rapid irregular heart rate noted on her iPhone.  Has been ongoing for several months.  Occurs at rest.  She will feel a rapid heart rate.  Denies excessive caffeine intake.  She will use an inhaler at times but symptoms were ongoing prior to using inhaler.  Associated with some chest discomfort or pressure and dyspnea. NOt noted as much in up moving around.  NO change in activity tolerance.  Mild dizziness with standing. NO near syncope, syncope.  Reports she has fallen due to dizziness.   Unsure if symptoms are related to anxiety.    No change in stressors.    Eats regular meals.  Stays well hydrated.  Works in day care setting.    No mentral cycles due to birth control    Pt feels that her anxiety and depression are well controlled with counseling.     No premature CAD 1st degree relative, arrhythmias.  Grandmother with CVA.          Objective     Vital Signs:   Vitals:    10/28/22 1403 10/28/22 1404 10/28/22 1405   BP: 132/83 128/80 127/80   BP Location: Right arm Left arm Left arm   Patient Position: Sitting Standing Sitting   Cuff Size: Adult Adult Adult   Pulse: (!) 140 (!) 149 (!) 136   Resp:   18   Temp: 97.2 °F (36.2 °C) 97.2 °F (36.2 °C) 97.2 °F (36.2 °C)   TempSrc: Temporal Temporal Temporal   SpO2: 98% 98% 96%   Weight:   65.5 kg (144 lb 4.8 oz)   Height:   162.6 cm (64\")     Body mass index is 24.77 kg/m².  Physical Exam  Vitals reviewed.   Constitutional:  "      General: She is not in acute distress.     Appearance: Normal appearance.   Cardiovascular:      Rate and Rhythm: Normal rate and regular rhythm.      Pulses:           Radial pulses are 2+ on the right side.        Dorsalis pedis pulses are 2+ on the right side.        Posterior tibial pulses are 2+ on the right side.      Heart sounds: Normal heart sounds.   Pulmonary:      Effort: Pulmonary effort is normal.      Breath sounds: Normal breath sounds.   Musculoskeletal:      Right lower leg: No edema.      Left lower leg: No edema.   Skin:     General: Skin is warm and dry.   Neurological:      Mental Status: She is alert.   Psychiatric:         Mood and Affect: Mood normal.         Behavior: Behavior is cooperative.              Result Review  Data Reviewed:{ Labs  Result Review  Imaging  Med Tab  Media :23}   EKG 10/24/2022: Sinus rhythm 95 bpm    Lab on 10/24/2022   Component Date Value Ref Range Status   • WBC 10/24/2022 7.55  3.40 - 10.80 10*3/mm3 Final   • RBC 10/24/2022 4.84  3.77 - 5.28 10*6/mm3 Final   • Hemoglobin 10/24/2022 12.8  12.0 - 15.9 g/dL Final   • Hematocrit 10/24/2022 38.2  34.0 - 46.6 % Final   • MCV 10/24/2022 78.9 (L)  79.0 - 97.0 fL Final   • MCH 10/24/2022 26.4 (L)  26.6 - 33.0 pg Final   • MCHC 10/24/2022 33.5  31.5 - 35.7 g/dL Final   • RDW 10/24/2022 12.3  12.3 - 15.4 % Final   • RDW-SD 10/24/2022 35.3 (L)  37.0 - 54.0 fl Final   • MPV 10/24/2022 11.6  6.0 - 12.0 fL Final   • Platelets 10/24/2022 235  140 - 450 10*3/mm3 Final   • Glucose 10/24/2022 88  65 - 99 mg/dL Final   • BUN 10/24/2022 11  6 - 20 mg/dL Final   • Creatinine 10/24/2022 0.81  0.57 - 1.00 mg/dL Final   • Sodium 10/24/2022 140  136 - 145 mmol/L Final   • Potassium 10/24/2022 4.7  3.5 - 5.2 mmol/L Final   • Chloride 10/24/2022 105  98 - 107 mmol/L Final   • CO2 10/24/2022 23.9  22.0 - 29.0 mmol/L Final   • Calcium 10/24/2022 8.6  8.6 - 10.5 mg/dL Final   • Total Protein 10/24/2022 7.1  6.0 - 8.5 g/dL Final   •  Albumin 10/24/2022 4.60  3.50 - 5.20 g/dL Final   • ALT (SGPT) 10/24/2022 14  1 - 33 U/L Final   • AST (SGOT) 10/24/2022 21  1 - 32 U/L Final   • Alkaline Phosphatase 10/24/2022 57  39 - 117 U/L Final   • Total Bilirubin 10/24/2022 0.7  0.0 - 1.2 mg/dL Final   • Globulin 10/24/2022 2.5  gm/dL Final   • A/G Ratio 10/24/2022 1.8  g/dL Final   • BUN/Creatinine Ratio 10/24/2022 13.6  7.0 - 25.0 Final   • Anion Gap 10/24/2022 11.1  5.0 - 15.0 mmol/L Final   • eGFR 10/24/2022 106.1  >60.0 mL/min/1.73 Final    National Kidney Foundation and American Society of Nephrology (ASN) Task Force recommended calculation based on the Chronic Kidney Disease Epidemiology Collaboration (CKD-EPI) equation refit without adjustment for race.   • TSH 10/24/2022 1.560  0.270 - 4.200 uIU/mL Final   • Iron 10/24/2022 81  37 - 145 mcg/dL Final   • Iron Saturation 10/24/2022 20  20 - 50 % Final   • Transferrin 10/24/2022 272  200 - 360 mg/dL Final   • TIBC 10/24/2022 405  298 - 536 mcg/dL Final                   Assessment and Plan {CC Problem List  Visit Diagnosis  ROS  Review (Popup)  Health Maintenance  Quality  BestPractice  Medications  SmartSets  SnapShot Encounters  Media :23}   1. Palpitations    - Holter Monitor - 72 Hour Up To 15 Days; Future    - Adult Transthoracic Echo Complete W/ Cont if Necessary Per Protocol; Future    - propranolol (INDERAL) 10 MG tablet; Take 1 tablet by mouth 2 (Two) Times a Day.  Dispense: 60 tablet; Refill: 1  -Patient will wear a cardiac heart monitor for 5 days then initiate propranolol while still wearing the monitor up to 14 days.    Encourage patient to stay well-hydrated, eat regular meals, good sleeping hygiene.  Regular exercise for stress reduction.  2. Dizziness    - Holter Monitor - 72 Hour Up To 15 Days; Future  - Adult Transthoracic Echo Complete W/ Cont if Necessary Per Protocol; Future    3. Tachycardia    - propranolol (INDERAL) 10 MG tablet; Take 1 tablet by mouth 2 (Two) Times  a Day.  Dispense: 60 tablet; Refill: 1    4. Anxiety/depression  Patient followed by behavioral health.  Currently not on medications.      Follow Up {Instructions Charge Capture  Follow-up Communications :23}   Return in about 6 weeks (around 12/9/2022) for Office visit, palpitations, monitor results.    Patient was given instructions and counseling regarding her condition or for health maintenance advice. Please see specific information pulled into the AVS if appropriate.  Patient was instructed to call the [facility] with any questions, concerns, or worsening symptoms.

## 2022-10-28 NOTE — PATIENT INSTRUCTIONS
Wear the heart monitor for 5 days, then start propranolol 10 mg twice a day. (Keep monitor on for full 14 days).

## 2022-11-18 ENCOUNTER — HOSPITAL ENCOUNTER (OUTPATIENT)
Dept: CARDIOLOGY | Facility: HOSPITAL | Age: 22
Discharge: HOME | End: 2022-11-18
Admitting: NURSE PRACTITIONER

## 2022-11-18 VITALS — WEIGHT: 143.3 LBS | HEIGHT: 64 IN | BODY MASS INDEX: 24.46 KG/M2

## 2022-11-18 DIAGNOSIS — R00.2 PALPITATIONS: ICD-10-CM

## 2022-11-18 DIAGNOSIS — R42 DIZZINESS: ICD-10-CM

## 2022-11-18 DIAGNOSIS — R00.0 TACHYCARDIA: ICD-10-CM

## 2022-11-18 LAB
BH CV ECHO MEAS - AO MAX PG: 7.2 MMHG
BH CV ECHO MEAS - AO MEAN PG: 4 MMHG
BH CV ECHO MEAS - AO ROOT DIAM: 2.5 CM
BH CV ECHO MEAS - AO V2 MAX: 134 CM/SEC
BH CV ECHO MEAS - AO V2 VTI: 26.7 CM
BH CV ECHO MEAS - AVA(I,D): 2.02 CM2
BH CV ECHO MEAS - EDV(CUBED): 54 ML
BH CV ECHO MEAS - EDV(MOD-SP2): 39 ML
BH CV ECHO MEAS - EDV(MOD-SP4): 49 ML
BH CV ECHO MEAS - EF(MOD-BP): 61 %
BH CV ECHO MEAS - EF(MOD-SP2): 59 %
BH CV ECHO MEAS - EF(MOD-SP4): 65.3 %
BH CV ECHO MEAS - ESV(CUBED): 17.4 ML
BH CV ECHO MEAS - ESV(MOD-SP2): 16 ML
BH CV ECHO MEAS - ESV(MOD-SP4): 17 ML
BH CV ECHO MEAS - FS: 31.5 %
BH CV ECHO MEAS - IVS/LVPW: 1.05 CM
BH CV ECHO MEAS - IVSD: 0.86 CM
BH CV ECHO MEAS - LA DIMENSION: 2.7 CM
BH CV ECHO MEAS - LAT PEAK E' VEL: 15.4 CM/SEC
BH CV ECHO MEAS - LV MASS(C)D: 90.1 GRAMS
BH CV ECHO MEAS - LV MAX PG: 5.5 MMHG
BH CV ECHO MEAS - LV MEAN PG: 2 MMHG
BH CV ECHO MEAS - LV V1 MAX: 117 CM/SEC
BH CV ECHO MEAS - LV V1 VTI: 21.2 CM
BH CV ECHO MEAS - LVIDD: 3.8 CM
BH CV ECHO MEAS - LVIDS: 2.6 CM
BH CV ECHO MEAS - LVOT AREA: 2.5 CM2
BH CV ECHO MEAS - LVOT DIAM: 1.8 CM
BH CV ECHO MEAS - LVPWD: 0.81 CM
BH CV ECHO MEAS - MED PEAK E' VEL: 12 CM/SEC
BH CV ECHO MEAS - MV A MAX VEL: 42 CM/SEC
BH CV ECHO MEAS - MV DEC SLOPE: 426 CM/SEC2
BH CV ECHO MEAS - MV DEC TIME: 0.25 MSEC
BH CV ECHO MEAS - MV E MAX VEL: 97.2 CM/SEC
BH CV ECHO MEAS - MV E/A: 2.31
BH CV ECHO MEAS - MV P1/2T: 79.1 MSEC
BH CV ECHO MEAS - MVA(P1/2T): 2.8 CM2
BH CV ECHO MEAS - PA ACC SLOPE: 658 CM/SEC2
BH CV ECHO MEAS - PA ACC TIME: 0.13 SEC
BH CV ECHO MEAS - PA PR(ACCEL): 20.9 MMHG
BH CV ECHO MEAS - SV(LVOT): 53.9 ML
BH CV ECHO MEAS - SV(MOD-SP2): 23 ML
BH CV ECHO MEAS - SV(MOD-SP4): 32 ML
BH CV ECHO MEAS - TAPSE (>1.6): 2.3 CM
BH CV ECHO MEAS - TR MAX PG: 17.3 MMHG
BH CV ECHO MEAS - TR MAX VEL: 208 CM/SEC
BH CV ECHO MEASUREMENTS AVERAGE E/E' RATIO: 7.09
BH CV VAS BP RIGHT ARM: NORMAL MMHG
BH CV XLRA - RV BASE: 2 CM
BH CV XLRA - RV LENGTH: 4.2 CM
BH CV XLRA - RV MID: 1.6 CM
BH CV XLRA - TDI S': 15.5 CM/SEC
IVRT: 79 MSEC
LEFT ATRIUM VOLUME INDEX: 17.1 ML/M2
MAXIMAL PREDICTED HEART RATE: 199 BPM
STRESS TARGET HR: 169 BPM

## 2022-11-18 PROCEDURE — 93306 TTE W/DOPPLER COMPLETE: CPT | Performed by: INTERNAL MEDICINE

## 2022-11-18 PROCEDURE — 93306 TTE W/DOPPLER COMPLETE: CPT

## 2022-11-18 NOTE — PROGRESS NOTES
Your echocardiogram results have been reviewed.  Your results are considered acceptable.  You have a normal heart muscle function.  No concerning valve disease was seen.

## 2022-12-08 ENCOUNTER — OFFICE VISIT (OUTPATIENT)
Dept: ORTHOPEDIC SURGERY | Facility: CLINIC | Age: 22
End: 2022-12-08

## 2022-12-08 VITALS
BODY MASS INDEX: 24.41 KG/M2 | WEIGHT: 143 LBS | SYSTOLIC BLOOD PRESSURE: 136 MMHG | HEIGHT: 64 IN | DIASTOLIC BLOOD PRESSURE: 82 MMHG

## 2022-12-08 DIAGNOSIS — G56.21 ULNAR NEUROPATHY AT ELBOW, RIGHT: Primary | ICD-10-CM

## 2022-12-08 DIAGNOSIS — M25.521 RIGHT ELBOW PAIN: ICD-10-CM

## 2022-12-08 PROCEDURE — 93248 EXT ECG>7D<15D REV&INTERPJ: CPT | Performed by: INTERNAL MEDICINE

## 2022-12-08 PROCEDURE — 99212 OFFICE O/P EST SF 10 MIN: CPT | Performed by: ORTHOPAEDIC SURGERY

## 2022-12-08 NOTE — PROGRESS NOTES
[facility] Orthopaedic Surgery Office Follow Up       Office Follow Up Visit       Patient Name: Marjorie Goff    Chief Complaint:   Chief Complaint   Patient presents with   • Follow-up     8 week f/u; Ulnar neuropathy at elbow, right        Referring Physician: No ref. provider found    History of Present Illness:   It has been 8  week(s) since Marjorie Goff's last visit. Marjorie Goff returns to clinic today for F/U: follow-up of rightBody Part: elbowReason: pain. The issue has been ongoing for 1 year(s). Marjorie Goff rates HIS/HER: herpain at 4/10 on the pain scale. Previous/current treatments: bracing and oral steroids. Current symptoms:Symptoms: same as prior visit. The pain is worse with working and lying on affected side; resting improves the pain. Overall, he/she: sheis doing the same. I have reviewed the patient's history of present illness as noted/entered above.    I have reviewed the patient's past medical history, surgical history, social history, family history, medications, and allergies as noted in the electronic medical record and as noted/entered.  I have reviewed the patient's review of systems as noted/enter and updated as noted in the patient's HPI.      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  No permanent or persistent numbness and tingling still transient and positional.  Patient does to stick with conservative course and I agree.     10/14/2022:  Right elbow symptoms still having symptoms symptoms especially with bending and job duties  Counseled again on young age and young age for any type of surgical release.     Doesn't  feel it's bad enough for surgery, etc. But desired repeat evaluation     Discussed her hypermobility and some counseling on prevention of ulnar nerve symptoms she does not have consistent numbness and tingling but does have periods where she gets radiating pain         Subjective   Subjective      Review of Systems   Constitutional: Negative.  Negative for chills, fatigue and fever.   HENT: Negative.  Negative for congestion and dental problem.    Eyes: Negative.  Negative for blurred vision.   Respiratory: Negative.  Negative for shortness of breath.    Cardiovascular: Negative.  Negative for leg swelling.   Gastrointestinal: Negative.  Negative for abdominal pain.   Endocrine: Negative.  Negative for polyuria.   Genitourinary: Negative.  Negative for difficulty urinating.   Musculoskeletal: Positive for arthralgias.   Skin: Negative.    Allergic/Immunologic: Negative.    Neurological: Negative.    Hematological: Negative.  Negative for adenopathy.   Psychiatric/Behavioral: Negative.  Negative for behavioral problems.        Past Medical History:   Past Medical History:   Diagnosis Date   • ADHD (attention deficit hyperactivity disorder)    • Anxiety    • Depression    • PTSD (post-traumatic stress disorder)        Past Surgical History:   Past Surgical History:   Procedure Laterality Date   • NO PAST SURGERIES         Family History:   Family History   Problem Relation Age of Onset   • Heart disease Mother    • Depression Mother    • COPD Mother    • Pneumonia Mother    • No Known Problems Father    • Other Brother         eye problems   • Heart attack Maternal Grandmother    • Diabetes Maternal Grandmother    • Stroke Maternal Grandmother    • No Known Problems Maternal Grandfather    • Breast cancer Neg Hx    • Ovarian cancer Neg Hx        Social History:   Social History     Socioeconomic History   • Marital status: Single   Tobacco Use   • Smoking status: Never   • Smokeless tobacco: Never   Vaping Use   •  "Vaping Use: Never used   Substance and Sexual Activity   • Alcohol use: Yes     Comment: social   • Drug use: Never   • Sexual activity: Yes     Partners: Female     Birth control/protection: Pill       Medications:   Current Outpatient Medications:   •  albuterol sulfate  (90 Base) MCG/ACT inhaler, Inhale 2 puffs Every 4 (Four) Hours As Needed., Disp: , Rfl:   •  medroxyPROGESTERone Acetate 150 MG/ML suspension prefilled syringe, Every 3 (Three) Months., Disp: , Rfl:   •  propranolol (INDERAL) 10 MG tablet, Take 1 tablet by mouth 2 (Two) Times a Day., Disp: 60 tablet, Rfl: 1    Allergies:   Allergies   Allergen Reactions   • Amoxicillin Hives   • Penicillins Hives       The following portions of the patient's history were reviewed and updated as appropriate: allergies, current medications, past family history, past medical history, past social history, past surgical history and problem list.        Objective    Objective      Vital Signs:   Vitals:    12/08/22 1316   BP: 136/82   Weight: 64.9 kg (143 lb)   Height: 162 cm (63.78\")       Ortho Exam:  RIGHT ELBOW  RIGHT elbow  Doing better  SLT and motor intact distally    Results Review:  Imaging Results (Last 24 Hours)     ** No results found for the last 24 hours. **          Procedures          Assessment / Plan      Assessment/Plan:   Problem List Items Addressed This Visit        Musculoskeletal and Injuries    Right elbow pain       Neuro    Ulnar neuropathy at elbow, right - Primary       RIGHT ELBOW  Subjectively and clinically improved which is great news  She will continue to follow symptomatolgy  She would need updated emg/ncv if symptoms change or worsen over time     Follow Up: as needed      Brian Muñoz MD, FAAOS  Orthopedic Surgeon  Fellowship Trained Shoulder and Elbow Surgeon  [facility]  Orthopedics and Sports Medicine  [address] [place] [address], [address]  [place] [address]    12/08/22  13:51 EST  "

## 2022-12-15 ENCOUNTER — OFFICE VISIT (OUTPATIENT)
Dept: FAMILY MEDICINE CLINIC | Facility: CLINIC | Age: 22
End: 2022-12-15

## 2022-12-15 VITALS
DIASTOLIC BLOOD PRESSURE: 74 MMHG | TEMPERATURE: 98.6 F | HEIGHT: 64 IN | SYSTOLIC BLOOD PRESSURE: 112 MMHG | BODY MASS INDEX: 24.69 KG/M2 | WEIGHT: 144.6 LBS | HEART RATE: 84 BPM | OXYGEN SATURATION: 98 %

## 2022-12-15 DIAGNOSIS — N30.00 ACUTE CYSTITIS WITHOUT HEMATURIA: ICD-10-CM

## 2022-12-15 DIAGNOSIS — R10.9 FLANK PAIN: ICD-10-CM

## 2022-12-15 DIAGNOSIS — R31.9 HEMATURIA, UNSPECIFIED TYPE: Primary | ICD-10-CM

## 2022-12-15 LAB
BILIRUB BLD-MCNC: NEGATIVE MG/DL
CLARITY, POC: CLEAR
COLOR UR: YELLOW
EXPIRATION DATE: ABNORMAL
GLUCOSE UR STRIP-MCNC: NEGATIVE MG/DL
KETONES UR QL: NEGATIVE
LEUKOCYTE EST, POC: ABNORMAL
Lab: ABNORMAL
NITRITE UR-MCNC: NEGATIVE MG/ML
PH UR: 6 [PH] (ref 5–8)
PROT UR STRIP-MCNC: ABNORMAL MG/DL
RBC # UR STRIP: NEGATIVE /UL
SP GR UR: 1.02 (ref 1–1.03)
UROBILINOGEN UR QL: NORMAL

## 2022-12-15 PROCEDURE — 87086 URINE CULTURE/COLONY COUNT: CPT | Performed by: PHYSICIAN ASSISTANT

## 2022-12-15 PROCEDURE — 99213 OFFICE O/P EST LOW 20 MIN: CPT | Performed by: PHYSICIAN ASSISTANT

## 2022-12-15 RX ORDER — NITROFURANTOIN 25; 75 MG/1; MG/1
100 CAPSULE ORAL EVERY 12 HOURS SCHEDULED
Qty: 14 CAPSULE | Refills: 0 | Status: SHIPPED | OUTPATIENT
Start: 2022-12-15 | End: 2022-12-22

## 2022-12-15 NOTE — PROGRESS NOTES
Chief Complaint   Patient presents with   • Blood in Urine         Marjorie Goff is a 22 y.o. female who presents for Blood in Urine.  Patient noticed blood in her urine 2 days ago.  She has lower back pain, worse along the right flank.  No fever, chills, nausea or abdominal discomfort.  No urinary frequency or urgency.  Patient denies that she is pregnant.  No history of kidney stones.      Past Medical History:   Diagnosis Date   • ADHD (attention deficit hyperactivity disorder)    • Anxiety    • Depression    • PTSD (post-traumatic stress disorder)        Past Surgical History:   Procedure Laterality Date   • NO PAST SURGERIES         Family History   Problem Relation Age of Onset   • Heart disease Mother    • Depression Mother    • COPD Mother    • Pneumonia Mother    • No Known Problems Father    • Other Brother         eye problems   • Heart attack Maternal Grandmother    • Diabetes Maternal Grandmother    • Stroke Maternal Grandmother    • No Known Problems Maternal Grandfather    • Breast cancer Neg Hx    • Ovarian cancer Neg Hx        Social History     Socioeconomic History   • Marital status: Single   Tobacco Use   • Smoking status: Never   • Smokeless tobacco: Never   Vaping Use   • Vaping Use: Never used   Substance and Sexual Activity   • Alcohol use: Yes     Comment: social   • Drug use: Never   • Sexual activity: Yes     Partners: Female     Birth control/protection: Pill       Allergies   Allergen Reactions   • Amoxicillin Hives   • Penicillins Hives       ROS    Review of Systems   Constitutional: Negative for chills and fever.   Gastrointestinal: Negative for abdominal pain and nausea.   Genitourinary: Positive for flank pain and hematuria. Negative for dysuria, frequency and urgency.       Vitals:    12/15/22 1513   BP: 112/74   BP Location: Left arm   Patient Position: Sitting   Cuff Size: Adult   Pulse: 84   Temp: 98.6 °F (37 °C)   TempSrc: Temporal   SpO2: 98%   Weight: 65.6 kg (144 lb 9.6  "oz)   Height: 162 cm (63.78\")   PainSc:   6   PainLoc: Back     Body mass index is 24.99 kg/m².    Current Outpatient Medications on File Prior to Visit   Medication Sig Dispense Refill   • albuterol sulfate  (90 Base) MCG/ACT inhaler Inhale 2 puffs Every 4 (Four) Hours As Needed.     • medroxyPROGESTERone Acetate 150 MG/ML suspension prefilled syringe Every 3 (Three) Months.     • propranolol (INDERAL) 10 MG tablet Take 1 tablet by mouth 2 (Two) Times a Day. 60 tablet 1     No current facility-administered medications on file prior to visit.       Results for orders placed or performed in visit on 12/15/22   POC Urinalysis Dipstick, Automated    Specimen: Urine   Result Value Ref Range    Color Yellow Yellow, Straw, Dark Yellow, Claudia    Clarity, UA Clear Clear    Specific Gravity  1.020 1.005 - 1.030    pH, Urine 6.0 5.0 - 8.0    Leukocytes Moderate (2+) (A) Negative    Nitrite, UA Negative Negative    Protein, POC 1+ (A) Negative mg/dL    Glucose, UA Negative Negative mg/dL    Ketones, UA Negative Negative    Urobilinogen, UA Normal Normal, 0.2 E.U./dL    Bilirubin Negative Negative    Blood, UA Negative Negative    Lot Number [id number]     Expiration Date 3/25/24        PE    Physical Exam  Vitals reviewed.   Constitutional:       General: She is not in acute distress.     Appearance: Normal appearance. She is well-developed. She is not ill-appearing or diaphoretic.   HENT:      Head: Normocephalic and atraumatic.   Eyes:      Extraocular Movements: Extraocular movements intact.      Conjunctiva/sclera: Conjunctivae normal.   Pulmonary:      Effort: No respiratory distress.   Abdominal:      Tenderness: There is abdominal tenderness in the suprapubic area.      Comments: Mild CVA tenderness along right.   Musculoskeletal:         General: Normal range of motion.      Cervical back: Normal range of motion.   Neurological:      General: No focal deficit present.      Mental Status: She is alert. "   Psychiatric:         Attention and Perception: She is attentive.         Mood and Affect: Mood normal.         Speech: Speech normal.         Behavior: Behavior normal. Behavior is cooperative.         Thought Content: Thought content normal.         Judgment: Judgment normal.          A/P    Diagnoses and all orders for this visit:    1. Hematuria, unspecified type (Primary)  -     POC Urinalysis Dipstick, Automated    2. Flank pain  -     POC Urinalysis Dipstick, Automated    3. Acute cystitis without hematuria  -     Urine Culture - Urine, Urine, Random Void; Future  -     nitrofurantoin, macrocrystal-monohydrate, (Macrobid) 100 MG capsule; Take 1 capsule by mouth Every 12 (Twelve) Hours for 7 days.  Dispense: 14 capsule; Refill: 0  -     Urine Culture - Urine, Urine, Random Void    Urinalysis has leukocytes.  With symptoms, will treat with macrobid.  Allergic to PCN.  Will send for culture.     Plan of care reviewed with patient at the conclusion of today's visit. Education was provided regarding diagnosis, management and any prescribed or recommended OTC medications.  Patient verbalizes understanding of and agreement with management plan.    Dictated Utilizing Dragon Dictation     Please note that portions of this note were completed with a voice recognition program.     Part of this note may be an electronic transcription/translation of spoken language to printed text using the Dragon Dictation System.    No follow-ups on file.     Junie Covarrubias PA-C

## 2022-12-17 LAB — BACTERIA SPEC AEROBE CULT: NORMAL

## 2022-12-20 ENCOUNTER — OFFICE VISIT (OUTPATIENT)
Dept: CARDIOLOGY | Facility: HOSPITAL | Age: 22
End: 2022-12-20

## 2022-12-20 VITALS
HEART RATE: 80 BPM | RESPIRATION RATE: 17 BRPM | HEIGHT: 64 IN | DIASTOLIC BLOOD PRESSURE: 72 MMHG | OXYGEN SATURATION: 97 % | WEIGHT: 144.4 LBS | BODY MASS INDEX: 24.65 KG/M2 | SYSTOLIC BLOOD PRESSURE: 118 MMHG | TEMPERATURE: 96.9 F

## 2022-12-20 DIAGNOSIS — R00.2 PALPITATIONS: Primary | ICD-10-CM

## 2022-12-20 DIAGNOSIS — R00.0 TACHYCARDIA: ICD-10-CM

## 2022-12-20 PROCEDURE — 99213 OFFICE O/P EST LOW 20 MIN: CPT | Performed by: NURSE PRACTITIONER

## 2022-12-20 RX ORDER — PROPRANOLOL HYDROCHLORIDE 10 MG/1
10 TABLET ORAL 2 TIMES DAILY
Qty: 60 TABLET | Refills: 6 | Status: SHIPPED | OUTPATIENT
Start: 2022-12-20 | End: 2023-02-27

## 2022-12-20 NOTE — PROGRESS NOTES
"[facility], [facility] Heart and Vascular    Chief Complaint  Follow-up and Results    Subjective    History of Present Illness {CC  Problem List  Visit  Diagnosis   Encounters  Notes  Medications  Labs  Result Review Imaging  Media :23}     Marjorie Goff presents to [facility] CARDIOLOGY for   History of Present Illness     22-year-old female with exercise-induced asthma, anxiety/depression.  Follow-up today on palpitations    Extended Holter 10/28/2022: Chest duration 13 days.  Average heart rate 85 bpm.  PACs less than 1%.  Controlled heart rate 73%.  No significant arrhythmias    Echocardiogram 11/18/2022: EF 61%.  No significant valvular disease.    Patient stated her palpitations and rapid heart rate were improved on the propranolol.  She ran out of her medications with worsening palpitations.  Overall she is doing well.  No significant concerns.    Objective     Vital Signs:   Vitals:    12/20/22 0854 12/20/22 0909   BP: 142/80 118/72 (repeat manual)   BP Location: Left arm    Patient Position: Sitting    Cuff Size: Adult    Pulse: 80    Resp: 17    Temp: 96.9 °F (36.1 °C)    TempSrc: Temporal    SpO2: 97%    Weight: 65.5 kg (144 lb 6.4 oz)    Height: 162 cm (63.78\")      Body mass index is 24.96 kg/m².  Physical Exam  Vitals reviewed.   Constitutional:       General: She is not in acute distress.     Appearance: Normal appearance.   Cardiovascular:      Rate and Rhythm: Normal rate and regular rhythm.      Pulses:           Radial pulses are 2+ on the right side.        Dorsalis pedis pulses are 2+ on the right side.        Posterior tibial pulses are 2+ on the right side.      Heart sounds: Normal heart sounds.   Pulmonary:      Effort: Pulmonary effort is normal.      Breath sounds: Normal breath sounds.   Musculoskeletal:      Right lower leg: No edema.      Left lower leg: No edema.   Skin:     General: Skin is warm and dry.   Neurological:      " Mental Status: She is alert.   Psychiatric:         Mood and Affect: Mood normal.         Behavior: Behavior is cooperative.              Result Review  Data Reviewed:{ Labs  Result Review  Imaging  Med Tab  Media :23}   Extended Holter 10/28/2022: Chest duration 13 days.  Average heart rate 85 bpm.  PACs less than 1%.  Controlled heart rate 73%.  No significant arrhythmias    Echocardiogram 11/18/2022: EF 61%.  No significant valvular disease.              Assessment and Plan {CC Problem List  Visit Diagnosis  ROS  Review (Popup)  Health Maintenance  Quality  BestPractice  Medications  SmartSets  SnapShot Encounters  Media :23}   1. Palpitations  Continue beta-blocker.  With refills.  - propranolol (INDERAL) 10 MG tablet; Take 1 tablet by mouth 2 (Two) Times a Day.  Dispense: 60 tablet; Refill: 6    2. Tachycardia  No significant tachycardia noted on cardiac heart monitor  - propranolol (INDERAL) 10 MG tablet; Take 1 tablet by mouth 2 (Two) Times a Day.  Dispense: 60 tablet; Refill: 6          Follow Up {Instructions Charge Capture  Follow-up Communications :23}   Return in about 6 months (around 6/20/2023) for Office visit, palpitations.    Patient was given instructions and counseling regarding her condition or for health maintenance advice. Please see specific information pulled into the AVS if appropriate.  Patient was instructed to call the [facility] with any questions, concerns, or worsening symptoms.

## 2023-02-06 RX ORDER — MEDROXYPROGESTERONE ACETATE 150 MG/ML
INJECTION, SUSPENSION INTRAMUSCULAR
Qty: 0.9 ML | OUTPATIENT
Start: 2023-02-06

## 2023-02-06 NOTE — TELEPHONE ENCOUNTER
Rx Refill Note  Requested Prescriptions     Pending Prescriptions Disp Refills   • medroxyPROGESTERone Acetate 150 MG/ML suspension prefilled syringe 0.9 mL      Sig: Every 3 (Three) Months.      Last office visit with prescribing clinician: 12/15/2022   Last telemedicine visit with prescribing clinician: Visit date not found   Next office visit with prescribing clinician: Visit date not found                         Would you like a call back once the refill request has been completed: [] Yes [] No    If the office needs to give you a call back, can they leave a voicemail: [] Yes [] No    Deniz Albert MA  02/06/23, 13:04 EST

## 2023-02-27 ENCOUNTER — OFFICE VISIT (OUTPATIENT)
Dept: FAMILY MEDICINE CLINIC | Facility: CLINIC | Age: 23
End: 2023-02-27
Payer: COMMERCIAL

## 2023-02-27 VITALS
WEIGHT: 151 LBS | HEIGHT: 64 IN | TEMPERATURE: 97.8 F | OXYGEN SATURATION: 99 % | SYSTOLIC BLOOD PRESSURE: 112 MMHG | DIASTOLIC BLOOD PRESSURE: 76 MMHG | BODY MASS INDEX: 25.78 KG/M2 | HEART RATE: 112 BPM

## 2023-02-27 DIAGNOSIS — F43.10 PTSD (POST-TRAUMATIC STRESS DISORDER): ICD-10-CM

## 2023-02-27 DIAGNOSIS — F41.9 ANXIETY: ICD-10-CM

## 2023-02-27 DIAGNOSIS — F33.1 MODERATE EPISODE OF RECURRENT MAJOR DEPRESSIVE DISORDER: ICD-10-CM

## 2023-02-27 DIAGNOSIS — R00.0 TACHYCARDIA: Primary | ICD-10-CM

## 2023-02-27 PROCEDURE — 99213 OFFICE O/P EST LOW 20 MIN: CPT | Performed by: PHYSICIAN ASSISTANT

## 2023-02-27 RX ORDER — PROPRANOLOL HYDROCHLORIDE 20 MG/1
20 TABLET ORAL 2 TIMES DAILY
Qty: 60 TABLET | Refills: 0 | Status: SHIPPED | OUTPATIENT
Start: 2023-02-27 | End: 2023-03-27 | Stop reason: SDUPTHER

## 2023-02-28 NOTE — PROGRESS NOTES
Chief Complaint   Patient presents with   • Hospital Follow Up Visit     Medication causing low potassium and high heart rate. Discharged on 2/19.    • referral     Referral for therapist       Marjorie Goff is a pleasant 22 y.o. female who is here for routine follow-up of recent ED visit to [facility] for dizziness and tachycardia.  She is established with heart and valve clinic and has upcoming appointment.  Denies chest pain.  Currently taking propranolol 10 mg twice daily.  Initially seemed to help but no longer seems to be helping.  Tolerating medication well.    Past Medical History:   Diagnosis Date   • ADHD (attention deficit hyperactivity disorder)    • Anxiety    • Depression    • PTSD (post-traumatic stress disorder)        Past Surgical History:   Procedure Laterality Date   • NO PAST SURGERIES         Family History   Problem Relation Age of Onset   • Heart disease Mother    • Depression Mother    • COPD Mother    • Pneumonia Mother    • No Known Problems Father    • Other Brother         eye problems   • Heart attack Maternal Grandmother    • Diabetes Maternal Grandmother    • Stroke Maternal Grandmother    • No Known Problems Maternal Grandfather    • Breast cancer Neg Hx    • Ovarian cancer Neg Hx        Social History     Socioeconomic History   • Marital status: Single   Tobacco Use   • Smoking status: Never   • Smokeless tobacco: Never   Vaping Use   • Vaping Use: Never used   Substance and Sexual Activity   • Alcohol use: Yes     Comment: social   • Drug use: Never   • Sexual activity: Not Currently     Partners: Female     Birth control/protection: Pill       Allergies   Allergen Reactions   • Amoxicillin Hives   • Penicillins Hives       ROS  Review of Systems   Respiratory: Negative for cough and shortness of breath.    Cardiovascular: Positive for palpitations. Negative for chest pain and leg swelling.   Neurological: Positive for dizziness. Negative for headache.       Vitals:    02/27/23 1350  "  BP: 112/76   Pulse: 112   Temp: 97.8 °F (36.6 °C)   TempSrc: Infrared   SpO2: 99%   Weight: 68.5 kg (151 lb)   Height: 162 cm (63.78\")   PainSc: 0-No pain     Body mass index is 26.1 kg/m².    Current Outpatient Medications on File Prior to Visit   Medication Sig Dispense Refill   • albuterol sulfate  (90 Base) MCG/ACT inhaler Inhale 2 puffs Every 4 (Four) Hours As Needed.     • medroxyPROGESTERone Acetate 150 MG/ML suspension prefilled syringe Every 3 (Three) Months.     • [DISCONTINUED] propranolol (INDERAL) 10 MG tablet Take 1 tablet by mouth 2 (Two) Times a Day. 60 tablet 6     No current facility-administered medications on file prior to visit.       Results for orders placed or performed in visit on 12/15/22   Urine Culture - Urine, Urine, Random Void    Specimen: Urine, Random Void   Result Value Ref Range    Urine Culture >100,000 CFU/mL Mixed Elena Isolated    POC Urinalysis Dipstick, Automated    Specimen: Urine   Result Value Ref Range    Color Yellow Yellow, Straw, Dark Yellow, Claudia    Clarity, UA Clear Clear    Specific Gravity  1.020 1.005 - 1.030    pH, Urine 6.0 5.0 - 8.0    Leukocytes Moderate (2+) (A) Negative    Nitrite, UA Negative Negative    Protein, POC 1+ (A) Negative mg/dL    Glucose, UA Negative Negative mg/dL    Ketones, UA Negative Negative    Urobilinogen, UA Normal Normal, 0.2 E.U./dL    Bilirubin Negative Negative    Blood, UA Negative Negative    Lot Number [id number]     Expiration Date 3/25/24        PE    Physical Exam  Vitals reviewed.   Constitutional:       General: She is not in acute distress.     Appearance: Normal appearance. She is well-developed and normal weight. She is not ill-appearing or diaphoretic.   HENT:      Head: Normocephalic and atraumatic.   Eyes:      Extraocular Movements: Extraocular movements intact.      Conjunctiva/sclera: Conjunctivae normal.   Cardiovascular:      Rate and Rhythm: Regular rhythm. Tachycardia present.      Heart sounds: " Normal heart sounds.   Pulmonary:      Effort: No respiratory distress.   Musculoskeletal:         General: Normal range of motion.      Cervical back: Normal range of motion.   Neurological:      General: No focal deficit present.      Mental Status: She is alert.   Psychiatric:         Attention and Perception: She is attentive.         Mood and Affect: Mood normal.         Speech: Speech normal.         Behavior: Behavior normal. Behavior is cooperative.         Thought Content: Thought content normal.         Judgment: Judgment normal.         A/P    Diagnoses and all orders for this visit:    1. Tachycardia (Primary)  -     propranolol (INDERAL) 20 MG tablet; Take 1 tablet by mouth 2 (Two) Times a Day.  Dispense: 60 tablet; Refill: 0  Increase propranolol to 20 mg twice daily.  Monitor heart rate and blood pressure at home.  Follow-up with heart and valve clinic.    2. PTSD (post-traumatic stress disorder)  -     Ambulatory Referral to Behavioral Health    3. Moderate episode of recurrent major depressive disorder (HCC)  -     Ambulatory Referral to Behavioral Health    4. Anxiety  -     Ambulatory Referral to Behavioral Health         Plan of care reviewed with patient at the conclusion of today's visit. Education was provided regarding diagnosis, management and any prescribed or recommended OTC medications.  Patient verbalizes understanding of and agreement with management plan.    Dictated Utilizing Dragon Dictation     Please note that portions of this note were completed with a voice recognition program.     Part of this note may be an electronic transcription/translation of spoken language to printed text using the Dragon Dictation System.    Return in about 4 weeks (around 3/27/2023) for Recheck, tachycardia.     Junie Covarrubias PA-C

## 2023-03-10 ENCOUNTER — TELEPHONE (OUTPATIENT)
Dept: FAMILY MEDICINE CLINIC | Facility: CLINIC | Age: 23
End: 2023-03-10
Payer: COMMERCIAL

## 2023-03-10 NOTE — TELEPHONE ENCOUNTER
Caller: Marjorie Goff    Relationship: Self    Best call back number: [phone]    What is the medical concern/diagnosis: BEHAVIOR HEALTH    What specialty or service is being requested: BEHAVIOR HEALTH    Any additional details: PATIENT STATES SHE RECEIVED A LETTER THAT WAS SUPPOSED TO ATTACH BEHAVIOR HEALTH PLACES SHE CAN CALL TO GO TO.

## 2023-03-10 NOTE — TELEPHONE ENCOUNTER
Contacted patient to discuss further, advised her that she has been referred to [facility], provided contact info     Also sent resources via [employer].

## 2023-03-27 ENCOUNTER — OFFICE VISIT (OUTPATIENT)
Dept: FAMILY MEDICINE CLINIC | Facility: CLINIC | Age: 23
End: 2023-03-27
Payer: COMMERCIAL

## 2023-03-27 ENCOUNTER — TELEPHONE (OUTPATIENT)
Dept: FAMILY MEDICINE CLINIC | Facility: CLINIC | Age: 23
End: 2023-03-27

## 2023-03-27 VITALS
DIASTOLIC BLOOD PRESSURE: 80 MMHG | BODY MASS INDEX: 25.61 KG/M2 | HEART RATE: 88 BPM | SYSTOLIC BLOOD PRESSURE: 122 MMHG | WEIGHT: 150 LBS | HEIGHT: 64 IN | OXYGEN SATURATION: 95 %

## 2023-03-27 DIAGNOSIS — F41.9 ANXIETY: ICD-10-CM

## 2023-03-27 DIAGNOSIS — J30.2 SEASONAL ALLERGIES: ICD-10-CM

## 2023-03-27 DIAGNOSIS — R00.0 TACHYCARDIA: Primary | ICD-10-CM

## 2023-03-27 PROCEDURE — 1160F RVW MEDS BY RX/DR IN RCRD: CPT | Performed by: PHYSICIAN ASSISTANT

## 2023-03-27 PROCEDURE — 99213 OFFICE O/P EST LOW 20 MIN: CPT | Performed by: PHYSICIAN ASSISTANT

## 2023-03-27 PROCEDURE — 1159F MED LIST DOCD IN RCRD: CPT | Performed by: PHYSICIAN ASSISTANT

## 2023-03-27 RX ORDER — PROPRANOLOL HYDROCHLORIDE 20 MG/1
20 TABLET ORAL 2 TIMES DAILY
Qty: 180 TABLET | Refills: 1 | Status: SHIPPED | OUTPATIENT
Start: 2023-03-27

## 2023-06-20 PROBLEM — L30.1 DYSHIDROTIC ECZEMA: Status: ACTIVE | Noted: 2023-06-20

## 2023-07-12 PROBLEM — F41.9 ANXIETY: Status: ACTIVE | Noted: 2023-07-12

## 2023-11-23 ENCOUNTER — HOSPITAL ENCOUNTER (EMERGENCY)
Facility: HOSPITAL | Age: 23
Discharge: HOME | End: 2023-11-23
Attending: STUDENT IN AN ORGANIZED HEALTH CARE EDUCATION/TRAINING PROGRAM
Payer: COMMERCIAL

## 2023-11-23 VITALS
OXYGEN SATURATION: 99 % | WEIGHT: 165.34 LBS | RESPIRATION RATE: 14 BRPM | HEART RATE: 114 BPM | TEMPERATURE: 97.6 F | HEIGHT: 64 IN | SYSTOLIC BLOOD PRESSURE: 138 MMHG | DIASTOLIC BLOOD PRESSURE: 91 MMHG | BODY MASS INDEX: 28.23 KG/M2

## 2023-11-23 DIAGNOSIS — R21 RASH AND NONSPECIFIC SKIN ERUPTION: Primary | ICD-10-CM

## 2023-11-23 PROCEDURE — 99283 EMERGENCY DEPT VISIT LOW MDM: CPT

## 2023-11-23 PROCEDURE — 63710000001 PREDNISONE PER 1 MG: Performed by: PHYSICIAN ASSISTANT

## 2023-11-23 RX ORDER — PREDNISONE 20 MG/1
60 TABLET ORAL ONCE
Status: COMPLETED | OUTPATIENT
Start: 2023-11-23 | End: 2023-11-23

## 2023-11-23 RX ORDER — FAMOTIDINE 20 MG/1
20 TABLET, FILM COATED ORAL ONCE
Status: COMPLETED | OUTPATIENT
Start: 2023-11-23 | End: 2023-11-23

## 2023-11-23 RX ORDER — PREDNISONE 20 MG/1
40 TABLET ORAL DAILY
Qty: 10 TABLET | Refills: 0 | Status: SHIPPED | OUTPATIENT
Start: 2023-11-23 | End: 2023-11-28

## 2023-11-23 RX ADMIN — FAMOTIDINE 20 MG: 20 TABLET, FILM COATED ORAL at 13:13

## 2023-11-23 RX ADMIN — PREDNISONE 60 MG: 20 TABLET ORAL at 13:15

## 2023-11-23 NOTE — ED PROVIDER NOTES
Subjective   History of Present Illness  Pt is a 24 yo female presenting to ED with complaints of rash. PMHx significant for ADHD, PTSD, Anxiety and Depression. Pt reports noticing a rash yesterday on her arms and neck. Today rash worse on her hips and face. She reports itching. No SOB, oral swelling or difficulty swallowing. She denies new lotions, detergents, soaps, foods, medications etc. No exposure to similar rash. She tried Benadryl yesterday and this morning with little improvement. She denies CP, neck stiffness, N/V/D or abdominal pain. She uses ETOH socially and denies tobacco or drug use.     History provided by:  Patient and medical records      Review of Systems   Constitutional:  Negative for fever.   HENT:  Negative for facial swelling and trouble swallowing.    Eyes:  Negative for visual disturbance.   Respiratory:  Negative for cough and shortness of breath.    Cardiovascular:  Negative for chest pain.   Gastrointestinal:  Negative for abdominal pain, diarrhea and vomiting.   Skin:  Positive for rash.   Neurological:  Negative for dizziness and headaches.       Past Medical History:   Diagnosis Date    ADHD (attention deficit hyperactivity disorder)     Anxiety     Depression     PTSD (post-traumatic stress disorder)        Allergies   Allergen Reactions    Amoxicillin Hives    Penicillins Hives       Past Surgical History:   Procedure Laterality Date    NO PAST SURGERIES         Family History   Problem Relation Age of Onset    Heart disease Mother     Depression Mother     COPD Mother     Pneumonia Mother     No Known Problems Father     Other Brother         eye problems    Heart attack Maternal Grandmother     Diabetes Maternal Grandmother     Stroke Maternal Grandmother     No Known Problems Maternal Grandfather     Breast cancer Neg Hx     Ovarian cancer Neg Hx        Social History     Socioeconomic History    Marital status: Single   Tobacco Use    Smoking status: Never    Smokeless tobacco:  Never   Vaping Use    Vaping Use: Never used   Substance and Sexual Activity    Alcohol use: Yes     Comment: social    Drug use: Never    Sexual activity: Not Currently     Partners: Female     Birth control/protection: Pill           Objective   Physical Exam  Vitals and nursing note reviewed.   Constitutional:       General: She is not in acute distress.  HENT:      Head: Atraumatic.      Nose: Nose normal.      Mouth/Throat:      Mouth: Mucous membranes are moist.      Pharynx: Oropharynx is clear. No oropharyngeal exudate or posterior oropharyngeal erythema.   Eyes:      Extraocular Movements: Extraocular movements intact.      Conjunctiva/sclera: Conjunctivae normal.   Cardiovascular:      Rate and Rhythm: Tachycardia present.   Pulmonary:      Effort: Pulmonary effort is normal. No respiratory distress.   Musculoskeletal:         General: Normal range of motion.      Cervical back: Normal range of motion and neck supple.   Skin:     General: Skin is warm.      Findings: Rash (Bilateral hips and a few scattered areas to wrists and elbows) present. Rash is urticarial.   Neurological:      General: No focal deficit present.      Mental Status: She is alert.   Psychiatric:         Mood and Affect: Mood is anxious.         Behavior: Behavior normal.         Procedures           ED Course      No results found for this or any previous visit (from the past 24 hour(s)).  Note: In addition to lab results from this visit, the labs listed above may include labs taken at another facility or during a different encounter within the last 24 hours. Please correlate lab times with ED admission and discharge times for further clarification of the services performed during this visit.    No orders to display     Vitals:    11/23/23 1300 11/23/23 1320   BP: 138/91    BP Location: Left arm    Patient Position: Sitting    Pulse: (!) 124 114   Resp: 14    Temp: 97.6 °F (36.4 °C)    TempSrc: Oral    SpO2: 97% 99%   Weight: 75 kg  "(165 lb 5.5 oz)    Height: 162.6 cm (64\")      Medications   predniSONE (DELTASONE) tablet 60 mg (60 mg Oral Given 11/23/23 1315)   famotidine (PEPCID) tablet 20 mg (20 mg Oral Given 11/23/23 1313)     ECG/EMG Results (last 24 hours)       ** No results found for the last 24 hours. **          No orders to display       DISCHARGE    Patient discharged in stable condition.    Reviewed implications of results, diagnosis, meds, responsibility to follow up, warning signs and symptoms of possible worsening, potential complications and reasons to return to ER.    Patient/Family voiced understanding of above instructions.    Discussed plan for discharge, as there is no emergent indication for admission.  Pt/family is agreeable and understands need for follow up and possible repeat testing.  Pt/family is aware that discharge does not mean that nothing is wrong but that it indicates no emergency is currently present that requires admission and they must continue care with follow-up as given below or with a physician of their choice.     FOLLOW-UP  Junie Covarrubias PA-C  [address] [address]  [phone]      As needed    [facility] EMERGENCY DEPARTMENT  [address] [facility] [id number] [phone]    If symptoms worsen         Medication List        New Prescriptions      predniSONE 20 MG tablet  Commonly known as: DELTASONE  Take 2 tablets by mouth Daily for 5 days.               Where to Get Your Medications        These medications were sent to [facility] [id number] - [place], [place] - [address] - [phone]  - [phone]   [address] [address]      Phone: [phone]   predniSONE 20 MG tablet                                            Medical Decision Making  Pt is a 22 yo female presenting to ED with complaints of rash. Patient given Prednisone and Pepcid in ED. Will dc home with Rx for Prednisone and " she will take Benadryl as well. Went over new / worse sx to return to ED.     DDx  Shingles, Hives, Allergic reaction to food, soap or medication, Air way compromise, Anaphylaxis     Problems Addressed:  Rash and nonspecific skin eruption: complicated acute illness or injury    Amount and/or Complexity of Data Reviewed  External Data Reviewed: notes.     Details: PCP    Risk  Prescription drug management.        Final diagnoses:   Rash and nonspecific skin eruption       ED Disposition  ED Disposition       ED Disposition   Discharge    Condition   Stable    Comment   --               Junie Covarrubias PA-C  [address] [address]  [phone]      As needed    [facility] EMERGENCY DEPARTMENT  [address] [facility] [address] [phone]    If symptoms worsen         Medication List        New Prescriptions      predniSONE 20 MG tablet  Commonly known as: DELTASONE  Take 2 tablets by mouth Daily for 5 days.               Where to Get Your Medications        These medications were sent to [facility] [id number] - [place], [place] - [address] [place] [address] - [phone]  - [phone]   [address] [place] [address]      Phone: [phone]   predniSONE 20 MG tablet            Joie Santana PA  11/23/23 5155

## 2024-07-23 NOTE — TELEPHONE ENCOUNTER
Rx Refill Note  Requested Prescriptions     Pending Prescriptions Disp Refills    sertraline (ZOLOFT) 50 MG tablet        Last office visit with prescribing clinician: 7/12/2023   Last telemedicine visit with prescribing clinician: Visit date not found   Next office visit with prescribing clinician: Visit date not found                         Would you like a call back once the refill request has been completed: [] Yes [] No    If the office needs to give you a call back, can they leave a voicemail: [] Yes [] No    Ana Cristina Camilo MA  07/23/24, 14:22 EDT

## 2025-08-07 ENCOUNTER — OFFICE VISIT (OUTPATIENT)
Dept: FAMILY MEDICINE CLINIC | Facility: CLINIC | Age: 25
End: 2025-08-07
Payer: MEDICAID

## 2025-08-07 VITALS
WEIGHT: 168 LBS | SYSTOLIC BLOOD PRESSURE: 116 MMHG | OXYGEN SATURATION: 99 % | HEIGHT: 64 IN | DIASTOLIC BLOOD PRESSURE: 74 MMHG | BODY MASS INDEX: 28.68 KG/M2 | HEART RATE: 98 BPM

## 2025-08-07 DIAGNOSIS — N92.1 MENORRHAGIA WITH IRREGULAR CYCLE: ICD-10-CM

## 2025-08-07 DIAGNOSIS — G43.711 INTRACTABLE CHRONIC MIGRAINE WITHOUT AURA AND WITH STATUS MIGRAINOSUS: Primary | ICD-10-CM

## 2025-08-07 DIAGNOSIS — R00.0 TACHYCARDIA: ICD-10-CM

## 2025-08-07 RX ORDER — PROPRANOLOL HCL 20 MG
20 TABLET ORAL 2 TIMES DAILY
Qty: 180 TABLET | Refills: 1 | Status: SHIPPED | OUTPATIENT
Start: 2025-08-07